# Patient Record
Sex: MALE | Race: BLACK OR AFRICAN AMERICAN | NOT HISPANIC OR LATINO | Employment: UNEMPLOYED | URBAN - METROPOLITAN AREA
[De-identification: names, ages, dates, MRNs, and addresses within clinical notes are randomized per-mention and may not be internally consistent; named-entity substitution may affect disease eponyms.]

---

## 2023-09-29 ENCOUNTER — HOSPITAL ENCOUNTER (EMERGENCY)
Facility: CLINIC | Age: 23
Discharge: HOME | End: 2023-09-29
Attending: EMERGENCY MEDICINE | Admitting: EMERGENCY MEDICINE

## 2023-09-29 ENCOUNTER — APPOINTMENT (OUTPATIENT)
Dept: CT IMAGING | Facility: CLINIC | Age: 23
End: 2023-09-29
Attending: EMERGENCY MEDICINE

## 2023-09-29 VITALS
RESPIRATION RATE: 18 BRPM | HEIGHT: 67 IN | DIASTOLIC BLOOD PRESSURE: 68 MMHG | BODY MASS INDEX: 25.11 KG/M2 | SYSTOLIC BLOOD PRESSURE: 103 MMHG | HEART RATE: 50 BPM | TEMPERATURE: 97.8 F | WEIGHT: 160 LBS | OXYGEN SATURATION: 99 %

## 2023-09-29 DIAGNOSIS — N20.1 LEFT URETERAL STONE: ICD-10-CM

## 2023-09-29 DIAGNOSIS — R10.9 LEFT FLANK PAIN: ICD-10-CM

## 2023-09-29 DIAGNOSIS — N20.0 NEPHROLITHIASIS: ICD-10-CM

## 2023-09-29 LAB
ALBUMIN SERPL BCG-MCNC: 4.4 G/DL (ref 3.5–5.2)
ALBUMIN UR-MCNC: 10 MG/DL
ALP SERPL-CCNC: 57 U/L (ref 40–129)
ALT SERPL W P-5'-P-CCNC: 21 U/L (ref 0–70)
ANION GAP SERPL CALCULATED.3IONS-SCNC: 9 MMOL/L (ref 7–15)
APPEARANCE UR: CLEAR
AST SERPL W P-5'-P-CCNC: 26 U/L (ref 0–45)
BASOPHILS # BLD AUTO: 0 10E3/UL (ref 0–0.2)
BASOPHILS NFR BLD AUTO: 0 %
BILIRUB SERPL-MCNC: 0.5 MG/DL
BILIRUB UR QL STRIP: NEGATIVE
BUN SERPL-MCNC: 14.6 MG/DL (ref 6–20)
CALCIUM SERPL-MCNC: 9.4 MG/DL (ref 8.6–10)
CHLORIDE SERPL-SCNC: 104 MMOL/L (ref 98–107)
COLOR UR AUTO: YELLOW
CREAT SERPL-MCNC: 0.81 MG/DL (ref 0.67–1.17)
DEPRECATED HCO3 PLAS-SCNC: 24 MMOL/L (ref 22–29)
EGFRCR SERPLBLD CKD-EPI 2021: >90 ML/MIN/1.73M2
EOSINOPHIL # BLD AUTO: 0.2 10E3/UL (ref 0–0.7)
EOSINOPHIL NFR BLD AUTO: 3 %
ERYTHROCYTE [DISTWIDTH] IN BLOOD BY AUTOMATED COUNT: 11.9 % (ref 10–15)
GLUCOSE SERPL-MCNC: 100 MG/DL (ref 70–99)
GLUCOSE UR STRIP-MCNC: NEGATIVE MG/DL
HCT VFR BLD AUTO: 41.8 % (ref 40–53)
HGB BLD-MCNC: 14.1 G/DL (ref 13.3–17.7)
HGB UR QL STRIP: ABNORMAL
IMM GRANULOCYTES # BLD: 0 10E3/UL
IMM GRANULOCYTES NFR BLD: 0 %
KETONES UR STRIP-MCNC: NEGATIVE MG/DL
LEUKOCYTE ESTERASE UR QL STRIP: NEGATIVE
LIPASE SERPL-CCNC: 37 U/L (ref 13–60)
LYMPHOCYTES # BLD AUTO: 2.2 10E3/UL (ref 0.8–5.3)
LYMPHOCYTES NFR BLD AUTO: 24 %
MCH RBC QN AUTO: 28.5 PG (ref 26.5–33)
MCHC RBC AUTO-ENTMCNC: 33.7 G/DL (ref 31.5–36.5)
MCV RBC AUTO: 85 FL (ref 78–100)
MONOCYTES # BLD AUTO: 0.6 10E3/UL (ref 0–1.3)
MONOCYTES NFR BLD AUTO: 6 %
MUCOUS THREADS #/AREA URNS LPF: PRESENT /LPF
NEUTROPHILS # BLD AUTO: 6.1 10E3/UL (ref 1.6–8.3)
NEUTROPHILS NFR BLD AUTO: 67 %
NITRATE UR QL: NEGATIVE
NRBC # BLD AUTO: 0 10E3/UL
NRBC BLD AUTO-RTO: 0 /100
PH UR STRIP: 6.5 [PH] (ref 5–7)
PLATELET # BLD AUTO: 143 10E3/UL (ref 150–450)
POTASSIUM SERPL-SCNC: 3.8 MMOL/L (ref 3.4–5.3)
PROT SERPL-MCNC: 7.4 G/DL (ref 6.4–8.3)
RBC # BLD AUTO: 4.94 10E6/UL (ref 4.4–5.9)
RBC URINE: 131 /HPF
SODIUM SERPL-SCNC: 137 MMOL/L (ref 135–145)
SP GR UR STRIP: 1.02 (ref 1–1.03)
UROBILINOGEN UR STRIP-MCNC: NORMAL MG/DL
WBC # BLD AUTO: 9.2 10E3/UL (ref 4–11)
WBC URINE: 2 /HPF

## 2023-09-29 PROCEDURE — 99284 EMERGENCY DEPT VISIT MOD MDM: CPT | Mod: 25 | Performed by: EMERGENCY MEDICINE

## 2023-09-29 PROCEDURE — 250N000011 HC RX IP 250 OP 636: Mod: JZ | Performed by: EMERGENCY MEDICINE

## 2023-09-29 PROCEDURE — 258N000003 HC RX IP 258 OP 636: Performed by: EMERGENCY MEDICINE

## 2023-09-29 PROCEDURE — 36415 COLL VENOUS BLD VENIPUNCTURE: CPT | Performed by: EMERGENCY MEDICINE

## 2023-09-29 PROCEDURE — 74176 CT ABD & PELVIS W/O CONTRAST: CPT

## 2023-09-29 PROCEDURE — 81003 URINALYSIS AUTO W/O SCOPE: CPT | Performed by: EMERGENCY MEDICINE

## 2023-09-29 PROCEDURE — 96374 THER/PROPH/DIAG INJ IV PUSH: CPT | Performed by: EMERGENCY MEDICINE

## 2023-09-29 PROCEDURE — 96361 HYDRATE IV INFUSION ADD-ON: CPT | Performed by: EMERGENCY MEDICINE

## 2023-09-29 PROCEDURE — 99284 EMERGENCY DEPT VISIT MOD MDM: CPT | Performed by: EMERGENCY MEDICINE

## 2023-09-29 PROCEDURE — 85004 AUTOMATED DIFF WBC COUNT: CPT | Performed by: EMERGENCY MEDICINE

## 2023-09-29 PROCEDURE — 83690 ASSAY OF LIPASE: CPT | Performed by: EMERGENCY MEDICINE

## 2023-09-29 PROCEDURE — 80053 COMPREHEN METABOLIC PANEL: CPT | Performed by: EMERGENCY MEDICINE

## 2023-09-29 RX ORDER — OXYCODONE HYDROCHLORIDE 5 MG/1
5 TABLET ORAL EVERY 6 HOURS PRN
Qty: 8 TABLET | Refills: 0 | Status: SHIPPED | OUTPATIENT
Start: 2023-09-29 | End: 2023-10-02

## 2023-09-29 RX ORDER — KETOROLAC TROMETHAMINE 15 MG/ML
15 INJECTION, SOLUTION INTRAMUSCULAR; INTRAVENOUS ONCE
Status: COMPLETED | OUTPATIENT
Start: 2023-09-29 | End: 2023-09-29

## 2023-09-29 RX ORDER — TAMSULOSIN HYDROCHLORIDE 0.4 MG/1
0.4 CAPSULE ORAL DAILY
Qty: 7 CAPSULE | Refills: 0 | Status: SHIPPED | OUTPATIENT
Start: 2023-09-29 | End: 2023-10-06

## 2023-09-29 RX ORDER — ONDANSETRON 4 MG/1
4 TABLET, ORALLY DISINTEGRATING ORAL EVERY 8 HOURS PRN
Qty: 10 TABLET | Refills: 0 | Status: SHIPPED | OUTPATIENT
Start: 2023-09-29 | End: 2023-10-02

## 2023-09-29 RX ADMIN — KETOROLAC TROMETHAMINE 15 MG: 15 INJECTION, SOLUTION INTRAMUSCULAR; INTRAVENOUS at 05:40

## 2023-09-29 RX ADMIN — SODIUM CHLORIDE 1000 ML: 9 INJECTION, SOLUTION INTRAVENOUS at 05:40

## 2023-09-29 ASSESSMENT — ACTIVITIES OF DAILY LIVING (ADL)
ADLS_ACUITY_SCORE: 35
ADLS_ACUITY_SCORE: 35

## 2023-09-29 NOTE — ED TRIAGE NOTES
Pt. started around 0230 with left sided abd. pain radiating around to back.  Also nausea and vomiting.     Triage Assessment       Row Name 09/29/23 0349       Triage Assessment (Adult)    Airway WDL WDL       Respiratory WDL    Respiratory WDL WDL       Skin Circulation/Temperature WDL    Skin Circulation/Temperature WDL WDL       Cardiac WDL    Cardiac WDL WDL       Peripheral/Neurovascular WDL    Peripheral Neurovascular WDL WDL       Cognitive/Neuro/Behavioral WDL    Cognitive/Neuro/Behavioral WDL WDL

## 2023-09-29 NOTE — DISCHARGE INSTRUCTIONS
Thank you for your patience today. Please follow up with your primary doctor in 2-3 days for follow up and further evaluation. Please call to make an appointment. A referral has been place to follow up with urology. Please drink plenty of fluids so you can pass the kidney stone. Please strain your urine. Please take 600mg of Ibuprofen every 6 hours for fever and pain. Please take Tylenol 1000mg every 4-6 hours for pain. Please take Flomax as prescribed. Please take zofran as needed for nausea and vomiting. Please take Oxycodone 1-2 tablets as needed for severe pain. (Please do not drive while on this medication). Please return to the ER if you develop high fever, persistent vomiting, severe pain, or any worsening of your current symptoms. It was a pleasure taking care of you today. We hope you feel better soon

## 2023-09-29 NOTE — ED PROVIDER NOTES
"ED Provider Note  [facility]      History     Chief Complaint   Patient presents with    Abdominal Pain    Flank Pain     left side     HPI  Ricci Mercedes is a 23 year old male who has no significant past medical history who presents to the emergency department for evaluation of flank pain.  Patient complains of left-sided flank pain that started this morning around 2:30 AM.  Patient describes the pain as a constant sharp stabbing pain in his left flank with some radiation down to his groin.  Patient reports pain is associated with nausea, vomiting.  Denies any fever, chills, chest pain, shortness of breath, dysuria.  No history of similar symptoms in the past.  No medications prior to arrival.  No other complaints.    Past Medical History  History reviewed. No pertinent past medical history.  History reviewed. No pertinent surgical history.  ondansetron (ZOFRAN ODT) 4 MG ODT tab  oxyCODONE (ROXICODONE) 5 MG tablet  tamsulosin (FLOMAX) 0.4 MG capsule      No Known Allergies  Family History  History reviewed. No pertinent family history.  Social History   Social History     Tobacco Use    Smoking status: Never    Smokeless tobacco: Never   Substance Use Topics    Alcohol use: Never    Drug use: Never      Past medical history, past surgical history, medications, allergies, family history, and social history were reviewed with the patient. No additional pertinent items.      A medically appropriate review of systems was performed with pertinent positives and negatives noted in the HPI, and all other systems negative.    Physical Exam   BP: 93/60  Pulse: (!) 47  Temp: 97.7  F (36.5  C)  Resp: 16  Height: 170.2 cm (5' 7\")  Weight: 72.6 kg (160 lb)  SpO2: 100 %  Physical Exam  General: Afebrile, moderate distress secondary to pain.  HEENT: Normocephalic, atraumatic, conjunctivae normal. MMM  Neck: non-tender, supple  Cardio: regular rate. regular rhythm   Resp: Normal work of breathing, no " respiratory distress, lungs clear bilaterally, no wheezing, rhonchi, rales  Chest/Back: no visual signs of trauma, positive left flank tenderness  Abdomen: soft, non distension, no tenderness, no rebound, no guarding, no peritoneal signs   Neuro: alert and fully oriented. CN II-XII grossly intact. Grossly normal strength and sensation in all extremities.   MSK: no deformities. Normal range of motion  Integumentary/Skin: no rash visualized, normal color  Psych: normal affect, normal behavior      ED Course, Procedures, & Data      Procedures       Results for orders placed or performed during the hospital encounter of 09/29/23   Abd/pelvis CT no contrast - Stone Protocol     Status: None    Narrative    EXAM: CT ABDOMEN PELVIS W/O CONTRAST  LOCATION: [facility]  DATE: 9/29/2023    INDICATION: flank pain  COMPARISON: None.  TECHNIQUE: CT scan of the abdomen and pelvis was performed without IV contrast. Multiplanar reformats were obtained. Dose reduction techniques were used.  CONTRAST: None.    FINDINGS:   LOWER CHEST: Normal.    HEPATOBILIARY: Normal.    PANCREAS: Normal.    SPLEEN: Normal.    ADRENAL GLANDS: Normal.    KIDNEYS/BLADDER: Punctate nonobstructing calyceal calculus of the right upper pole. 2 mm calculus of the distal left ureter with associated mild upstream hydroureter. Unremarkable urinary bladder.    BOWEL: Large volume colonic stool suggestive of constipation. No bowel obstruction.    LYMPH NODES: Normal.    VASCULATURE: Unremarkable.    PELVIC ORGANS: Prominent prostate.    MUSCULOSKELETAL: Normal.      Impression    IMPRESSION:   1.  2 mm calculus of the distal left ureter with associated mild upstream hydroureter.  2.  Punctate nonobstructing calyceal calculus of the right upper pole.  3.  Large volume colonic stool suggestive of constipation.     Comprehensive metabolic panel     Status: Abnormal   Result Value Ref Range    Sodium 137 135 - 145 mmol/L     Potassium 3.8 3.4 - 5.3 mmol/L    Carbon Dioxide (CO2) 24 22 - 29 mmol/L    Anion Gap 9 7 - 15 mmol/L    Urea Nitrogen 14.6 6.0 - 20.0 mg/dL    Creatinine 0.81 0.67 - 1.17 mg/dL    GFR Estimate >90 >60 mL/min/1.73m2    Calcium 9.4 8.6 - 10.0 mg/dL    Chloride 104 98 - 107 mmol/L    Glucose 100 (H) 70 - 99 mg/dL    Alkaline Phosphatase 57 40 - 129 U/L    AST 26 0 - 45 U/L    ALT 21 0 - 70 U/L    Protein Total 7.4 6.4 - 8.3 g/dL    Albumin 4.4 3.5 - 5.2 g/dL    Bilirubin Total 0.5 <=1.2 mg/dL   Lipase     Status: Normal   Result Value Ref Range    Lipase 37 13 - 60 U/L   UA with Microscopic reflex to Culture     Status: Abnormal    Specimen: Urine, Midstream   Result Value Ref Range    Color Urine Yellow Colorless, Straw, Light Yellow, Yellow    Appearance Urine Clear Clear    Glucose Urine Negative Negative mg/dL    Bilirubin Urine Negative Negative    Ketones Urine Negative Negative mg/dL    Specific Gravity Urine 1.023 1.003 - 1.035    Blood Urine Moderate (A) Negative    pH Urine 6.5 5.0 - 7.0    Protein Albumin Urine 10 (A) Negative mg/dL    Urobilinogen Urine Normal Normal, 2.0 mg/dL    Nitrite Urine Negative Negative    Leukocyte Esterase Urine Negative Negative    Mucus Urine Present (A) None Seen /LPF    RBC Urine 131 (H) <=2 /HPF    WBC Urine 2 <=5 /HPF    Narrative    Urine Culture not indicated   CBC with platelets and differential     Status: Abnormal   Result Value Ref Range    WBC Count 9.2 4.0 - 11.0 10e3/uL    RBC Count 4.94 4.40 - 5.90 10e6/uL    Hemoglobin 14.1 13.3 - 17.7 g/dL    Hematocrit 41.8 40.0 - 53.0 %    MCV 85 78 - 100 fL    MCH 28.5 26.5 - 33.0 pg    MCHC 33.7 31.5 - 36.5 g/dL    RDW 11.9 10.0 - 15.0 %    Platelet Count 143 (L) 150 - 450 10e3/uL    % Neutrophils 67 %    % Lymphocytes 24 %    % Monocytes 6 %    % Eosinophils 3 %    % Basophils 0 %    % Immature Granulocytes 0 %    NRBCs per 100 WBC 0 <1 /100    Absolute Neutrophils 6.1 1.6 - 8.3 10e3/uL    Absolute Lymphocytes 2.2 0.8 -  5.3 10e3/uL    Absolute Monocytes 0.6 0.0 - 1.3 10e3/uL    Absolute Eosinophils 0.2 0.0 - 0.7 10e3/uL    Absolute Basophils 0.0 0.0 - 0.2 10e3/uL    Absolute Immature Granulocytes 0.0 <=0.4 10e3/uL    Absolute NRBCs 0.0 10e3/uL   CBC with platelets differential     Status: Abnormal    Narrative    The following orders were created for panel order CBC with platelets differential.  Procedure                               Abnormality         Status                     ---------                               -----------         ------                     CBC with platelets and d...[[id number]]  Abnormal            Final result                 Please view results for these tests on the individual orders.     Medications   sodium chloride 0.9% BOLUS 1,000 mL (0 mLs Intravenous Stopped 9/29/23 0633)   ketorolac (TORADOL) injection 15 mg (15 mg Intravenous $Given 9/29/23 0540)     Labs Ordered and Resulted from Time of ED Arrival to Time of ED Departure   COMPREHENSIVE METABOLIC PANEL - Abnormal       Result Value    Sodium 137      Potassium 3.8      Carbon Dioxide (CO2) 24      Anion Gap 9      Urea Nitrogen 14.6      Creatinine 0.81      GFR Estimate >90      Calcium 9.4      Chloride 104      Glucose 100 (*)     Alkaline Phosphatase 57      AST 26      ALT 21      Protein Total 7.4      Albumin 4.4      Bilirubin Total 0.5     ROUTINE UA WITH MICROSCOPIC REFLEX TO CULTURE - Abnormal    Color Urine Yellow      Appearance Urine Clear      Glucose Urine Negative      Bilirubin Urine Negative      Ketones Urine Negative      Specific Gravity Urine 1.023      Blood Urine Moderate (*)     pH Urine 6.5      Protein Albumin Urine 10 (*)     Urobilinogen Urine Normal      Nitrite Urine Negative      Leukocyte Esterase Urine Negative      Mucus Urine Present (*)     RBC Urine 131 (*)     WBC Urine 2     CBC WITH PLATELETS AND DIFFERENTIAL - Abnormal    WBC Count 9.2      RBC Count 4.94      Hemoglobin 14.1      Hematocrit 41.8       MCV 85      MCH 28.5      MCHC 33.7      RDW 11.9      Platelet Count 143 (*)     % Neutrophils 67      % Lymphocytes 24      % Monocytes 6      % Eosinophils 3      % Basophils 0      % Immature Granulocytes 0      NRBCs per 100 WBC 0      Absolute Neutrophils 6.1      Absolute Lymphocytes 2.2      Absolute Monocytes 0.6      Absolute Eosinophils 0.2      Absolute Basophils 0.0      Absolute Immature Granulocytes 0.0      Absolute NRBCs 0.0     LIPASE - Normal    Lipase 37       Abd/pelvis CT no contrast - Stone Protocol   Final Result   IMPRESSION:    1.  2 mm calculus of the distal left ureter with associated mild upstream hydroureter.   2.  Punctate nonobstructing calyceal calculus of the right upper pole.   3.  Large volume colonic stool suggestive of constipation.                Critical care was not performed.     Medical Decision Making  The patient's presentation was of moderate complexity (an acute illness with systemic symptoms).    The patient's evaluation involved:  ordering and/or review of 3+ test(s) in this encounter (see separate area of note for details)  independent interpretation of testing performed by another health professional (CT)    The patient's management necessitated moderate risk (prescription drug management including medications given in the ED).    Assessment & Plan    Ricci Mercedes is a 23 year old male who has no significant past medical history who presents to the emergency department for evaluation of flank pain.  Upon arrival patient is nontoxic-appearing, afebrile, moderate distress secondary to pain.  Patient hemodynamically stable and vital signs within normal limits.  Differential diagnosis includes but is not limited to nephrolithiasis versus pyelonephritis versus cystitis versus intra-abdominal pathology/infection versus musculoskeletal among others.  Upon arrival comprehensive labs were performed, patient was treated with IV Toradol, 1 L IV fluid bolus.  I reviewed  comprehensive labs which are unremarkable with white blood cell count 9.2, hemoglobin 14.1, no acute metabolic or electrolyte abnormality, no transaminitis, normal lipase, urinalysis with no evidence of acute infection, moderate amount of blood, 131 white blood cells.  CT scan was performed.  I personally reviewed and interpreted CT scan of the abdomen pelvis which demonstrates 2 mm calculus of the distal left ureter with mild hydroureter.     On reevaluation patient resting comfortably with significant improvement of his symptoms after IV fluids, IV medications.  I discussed results with patient, mother.  Patient feels comfortable with discharge home with ongoing oral hydration, pain control, close outpatient follow-up with his primary care provider or urology. -Referral placed.  Strict return precautions discussed.  Patient understands and agrees with the plan.    I have reviewed the nursing notes. I have reviewed the findings, diagnosis, plan and need for follow up with the patient.    Discharge Medication List as of 9/29/2023  7:13 AM        START taking these medications    Details   ondansetron (ZOFRAN ODT) 4 MG ODT tab Take 1 tablet (4 mg) by mouth every 8 hours as needed for nausea, Disp-10 tablet, R-0, Local Print      oxyCODONE (ROXICODONE) 5 MG tablet Take 1 tablet (5 mg) by mouth every 6 hours as needed for severe pain, Disp-8 tablet, R-0, Local Print      tamsulosin (FLOMAX) 0.4 MG capsule Take 1 capsule (0.4 mg) by mouth daily for 7 days, Disp-7 capsule, R-0, Local Print             Final diagnoses:   Left flank pain   Nephrolithiasis   Left ureteral stone       Elaien Avina MD  [facility] EMERGENCY DEPARTMENT  9/29/2023     Elaine Avina MD  09/30/23 0016

## 2025-06-22 ENCOUNTER — HOSPITAL ENCOUNTER (OUTPATIENT)
Facility: CLINIC | Age: 25
Discharge: HOME | End: 2025-06-23
Attending: EMERGENCY MEDICINE | Admitting: EMERGENCY MEDICINE
Payer: COMMERCIAL

## 2025-06-22 ENCOUNTER — APPOINTMENT (OUTPATIENT)
Dept: CT IMAGING | Facility: CLINIC | Age: 25
End: 2025-06-22
Attending: EMERGENCY MEDICINE
Payer: COMMERCIAL

## 2025-06-22 DIAGNOSIS — K35.30 ACUTE APPENDICITIS WITH LOCALIZED PERITONITIS, WITHOUT PERFORATION, ABSCESS, OR GANGRENE: ICD-10-CM

## 2025-06-22 LAB
ALBUMIN SERPL BCG-MCNC: 4.7 G/DL (ref 3.5–5.2)
ALBUMIN UR-MCNC: NEGATIVE MG/DL
ALP SERPL-CCNC: 65 U/L (ref 40–150)
ALT SERPL W P-5'-P-CCNC: 23 U/L (ref 0–70)
ANION GAP SERPL CALCULATED.3IONS-SCNC: 14 MMOL/L (ref 7–15)
APPEARANCE UR: CLEAR
AST SERPL W P-5'-P-CCNC: 30 U/L (ref 0–45)
BASOPHILS # BLD AUTO: 0 10E3/UL (ref 0–0.2)
BASOPHILS NFR BLD AUTO: 0 %
BILIRUB SERPL-MCNC: 0.9 MG/DL
BILIRUB UR QL STRIP: NEGATIVE
BUN SERPL-MCNC: 19.2 MG/DL (ref 6–20)
CALCIUM SERPL-MCNC: 9.6 MG/DL (ref 8.8–10.4)
CHLORIDE SERPL-SCNC: 104 MMOL/L (ref 98–107)
COLOR UR AUTO: YELLOW
CREAT SERPL-MCNC: 0.82 MG/DL (ref 0.67–1.17)
EGFRCR SERPLBLD CKD-EPI 2021: >90 ML/MIN/1.73M2
EOSINOPHIL # BLD AUTO: 0.2 10E3/UL (ref 0–0.7)
EOSINOPHIL NFR BLD AUTO: 1 %
ERYTHROCYTE [DISTWIDTH] IN BLOOD BY AUTOMATED COUNT: 11.6 % (ref 10–15)
GLUCOSE SERPL-MCNC: 103 MG/DL (ref 70–99)
GLUCOSE UR STRIP-MCNC: NEGATIVE MG/DL
HCO3 SERPL-SCNC: 23 MMOL/L (ref 22–29)
HCT VFR BLD AUTO: 44.4 % (ref 40–53)
HGB BLD-MCNC: 15.3 G/DL (ref 13.3–17.7)
HGB UR QL STRIP: NEGATIVE
HOLD SPECIMEN: NORMAL
IMM GRANULOCYTES # BLD: 0 10E3/UL
IMM GRANULOCYTES NFR BLD: 0 %
KETONES UR STRIP-MCNC: 40 MG/DL
LEUKOCYTE ESTERASE UR QL STRIP: NEGATIVE
LYMPHOCYTES # BLD AUTO: 1.4 10E3/UL (ref 0.8–5.3)
LYMPHOCYTES NFR BLD AUTO: 11 %
MCH RBC QN AUTO: 29.5 PG (ref 26.5–33)
MCHC RBC AUTO-ENTMCNC: 34.5 G/DL (ref 31.5–36.5)
MCV RBC AUTO: 86 FL (ref 78–100)
MONOCYTES # BLD AUTO: 0.9 10E3/UL (ref 0–1.3)
MONOCYTES NFR BLD AUTO: 7 %
MUCOUS THREADS #/AREA URNS LPF: PRESENT /LPF
NEUTROPHILS # BLD AUTO: 10.6 10E3/UL (ref 1.6–8.3)
NEUTROPHILS NFR BLD AUTO: 81 %
NITRATE UR QL: NEGATIVE
NRBC # BLD AUTO: 0 10E3/UL
NRBC BLD AUTO-RTO: 0 /100
PH UR STRIP: 6.5 [PH] (ref 5–7)
PLATELET # BLD AUTO: 155 10E3/UL (ref 150–450)
POTASSIUM SERPL-SCNC: 4 MMOL/L (ref 3.4–5.3)
PROT SERPL-MCNC: 7.5 G/DL (ref 6.4–8.3)
RADIOLOGIST FLAGS: ABNORMAL
RBC # BLD AUTO: 5.18 10E6/UL (ref 4.4–5.9)
RBC URINE: <1 /HPF
SODIUM SERPL-SCNC: 141 MMOL/L (ref 135–145)
SP GR UR STRIP: 1.03 (ref 1–1.03)
SQUAMOUS EPITHELIAL: <1 /HPF
UROBILINOGEN UR STRIP-MCNC: NORMAL MG/DL
WBC # BLD AUTO: 13.1 10E3/UL (ref 4–11)
WBC URINE: 1 /HPF

## 2025-06-22 PROCEDURE — 83690 ASSAY OF LIPASE: CPT | Performed by: EMERGENCY MEDICINE

## 2025-06-22 PROCEDURE — 250N000011 HC RX IP 250 OP 636: Performed by: EMERGENCY MEDICINE

## 2025-06-22 PROCEDURE — 81001 URINALYSIS AUTO W/SCOPE: CPT | Performed by: EMERGENCY MEDICINE

## 2025-06-22 PROCEDURE — 84155 ASSAY OF PROTEIN SERUM: CPT | Performed by: EMERGENCY MEDICINE

## 2025-06-22 PROCEDURE — 85025 COMPLETE CBC W/AUTO DIFF WBC: CPT | Performed by: EMERGENCY MEDICINE

## 2025-06-22 PROCEDURE — 250N000009 HC RX 250: Performed by: EMERGENCY MEDICINE

## 2025-06-22 PROCEDURE — 74177 CT ABD & PELVIS W/CONTRAST: CPT

## 2025-06-22 PROCEDURE — 36415 COLL VENOUS BLD VENIPUNCTURE: CPT | Performed by: EMERGENCY MEDICINE

## 2025-06-22 RX ORDER — ONDANSETRON 2 MG/ML
4 INJECTION INTRAMUSCULAR; INTRAVENOUS EVERY 30 MIN PRN
Status: DISCONTINUED | OUTPATIENT
Start: 2025-06-22 | End: 2025-06-23 | Stop reason: HOSPADM

## 2025-06-22 RX ORDER — MORPHINE SULFATE 2 MG/ML
4 INJECTION, SOLUTION INTRAMUSCULAR; INTRAVENOUS
Refills: 0 | Status: COMPLETED | OUTPATIENT
Start: 2025-06-22 | End: 2025-06-22

## 2025-06-22 RX ORDER — IOPAMIDOL 755 MG/ML
100 INJECTION, SOLUTION INTRAVASCULAR ONCE
Status: COMPLETED | OUTPATIENT
Start: 2025-06-22 | End: 2025-06-22

## 2025-06-22 RX ADMIN — IOPAMIDOL 66 ML: 755 INJECTION, SOLUTION INTRAVENOUS at 23:09

## 2025-06-22 RX ADMIN — MORPHINE SULFATE 4 MG: 2 INJECTION, SOLUTION INTRAMUSCULAR; INTRAVENOUS at 21:20

## 2025-06-22 RX ADMIN — SODIUM CHLORIDE 56 ML: 9 INJECTION, SOLUTION INTRAVENOUS at 23:10

## 2025-06-22 ASSESSMENT — ACTIVITIES OF DAILY LIVING (ADL)
ADLS_ACUITY_SCORE: 41

## 2025-06-23 ENCOUNTER — ANESTHESIA EVENT (OUTPATIENT)
Dept: SURGERY | Facility: CLINIC | Age: 25
End: 2025-06-23
Payer: COMMERCIAL

## 2025-06-23 ENCOUNTER — ANESTHESIA (OUTPATIENT)
Dept: SURGERY | Facility: CLINIC | Age: 25
End: 2025-06-23
Payer: COMMERCIAL

## 2025-06-23 VITALS
TEMPERATURE: 97.8 F | RESPIRATION RATE: 14 BRPM | BODY MASS INDEX: 19.26 KG/M2 | SYSTOLIC BLOOD PRESSURE: 105 MMHG | WEIGHT: 134.5 LBS | HEIGHT: 70 IN | HEART RATE: 57 BPM | DIASTOLIC BLOOD PRESSURE: 62 MMHG | OXYGEN SATURATION: 97 %

## 2025-06-23 LAB
ABO + RH BLD: ABNORMAL
BLD GP AB INVEST PLASRBC-IMP: NORMAL
BLD GP AB SCN SERPL QL: POSITIVE
BLOOD GROUP ANTIBODIES SERPL ELUTION: NORMAL
DAT POLY: NEGATIVE
INR PPP: 1.18 (ref 0.85–1.15)
LIPASE SERPL-CCNC: 22 U/L (ref 13–60)
PROTHROMBIN TIME: 14.9 SECONDS (ref 11.8–14.8)
SPECIMEN EXP DATE BLD: ABNORMAL
SPECIMEN EXP DATE BLD: NORMAL

## 2025-06-23 PROCEDURE — 86860 RBC ANTIBODY ELUTION: CPT | Performed by: EMERGENCY MEDICINE

## 2025-06-23 PROCEDURE — 86905 BLOOD TYPING RBC ANTIGENS: CPT | Performed by: EMERGENCY MEDICINE

## 2025-06-23 PROCEDURE — 88304 TISSUE EXAM BY PATHOLOGIST: CPT | Mod: 26 | Performed by: STUDENT IN AN ORGANIZED HEALTH CARE EDUCATION/TRAINING PROGRAM

## 2025-06-23 PROCEDURE — 86870 RBC ANTIBODY IDENTIFICATION: CPT | Performed by: EMERGENCY MEDICINE

## 2025-06-23 PROCEDURE — 44970 LAPAROSCOPY APPENDECTOMY: CPT | Mod: GC | Performed by: SURGERY

## 2025-06-23 PROCEDURE — 96365 THER/PROPH/DIAG IV INF INIT: CPT | Mod: 59 | Performed by: EMERGENCY MEDICINE

## 2025-06-23 PROCEDURE — 360N000076 HC SURGERY LEVEL 3, PER MIN: Performed by: SURGERY

## 2025-06-23 PROCEDURE — 999N000141 HC STATISTIC PRE-PROCEDURE NURSING ASSESSMENT: Performed by: SURGERY

## 2025-06-23 PROCEDURE — 86900 BLOOD TYPING SEROLOGIC ABO: CPT | Performed by: EMERGENCY MEDICINE

## 2025-06-23 PROCEDURE — 250N000011 HC RX IP 250 OP 636

## 2025-06-23 PROCEDURE — 99285 EMERGENCY DEPT VISIT HI MDM: CPT | Mod: 25 | Performed by: EMERGENCY MEDICINE

## 2025-06-23 PROCEDURE — 96366 THER/PROPH/DIAG IV INF ADDON: CPT | Mod: XU | Performed by: EMERGENCY MEDICINE

## 2025-06-23 PROCEDURE — 88304 TISSUE EXAM BY PATHOLOGIST: CPT | Mod: TC | Performed by: SURGERY

## 2025-06-23 PROCEDURE — 250N000025 HC SEVOFLURANE, PER MIN: Performed by: SURGERY

## 2025-06-23 PROCEDURE — 258N000003 HC RX IP 258 OP 636: Performed by: REGISTERED NURSE

## 2025-06-23 PROCEDURE — 96375 TX/PRO/DX INJ NEW DRUG ADDON: CPT | Mod: XU | Performed by: EMERGENCY MEDICINE

## 2025-06-23 PROCEDURE — 250N000013 HC RX MED GY IP 250 OP 250 PS 637

## 2025-06-23 PROCEDURE — 99223 1ST HOSP IP/OBS HIGH 75: CPT | Mod: 57 | Performed by: SURGERY

## 2025-06-23 PROCEDURE — 85610 PROTHROMBIN TIME: CPT | Performed by: EMERGENCY MEDICINE

## 2025-06-23 PROCEDURE — 250N000011 HC RX IP 250 OP 636: Performed by: REGISTERED NURSE

## 2025-06-23 PROCEDURE — 250N000011 HC RX IP 250 OP 636: Performed by: EMERGENCY MEDICINE

## 2025-06-23 PROCEDURE — 710N000012 HC RECOVERY PHASE 2, PER MINUTE: Performed by: SURGERY

## 2025-06-23 PROCEDURE — 99285 EMERGENCY DEPT VISIT HI MDM: CPT | Performed by: EMERGENCY MEDICINE

## 2025-06-23 PROCEDURE — 370N000017 HC ANESTHESIA TECHNICAL FEE, PER MIN: Performed by: SURGERY

## 2025-06-23 PROCEDURE — 272N000001 HC OR GENERAL SUPPLY STERILE: Performed by: SURGERY

## 2025-06-23 PROCEDURE — 36415 COLL VENOUS BLD VENIPUNCTURE: CPT | Performed by: EMERGENCY MEDICINE

## 2025-06-23 PROCEDURE — 710N000010 HC RECOVERY PHASE 1, LEVEL 2, PER MIN: Performed by: SURGERY

## 2025-06-23 PROCEDURE — 250N000009 HC RX 250: Performed by: REGISTERED NURSE

## 2025-06-23 PROCEDURE — 250N000011 HC RX IP 250 OP 636: Performed by: SURGERY

## 2025-06-23 PROCEDURE — 258N000003 HC RX IP 258 OP 636: Performed by: EMERGENCY MEDICINE

## 2025-06-23 PROCEDURE — 86880 COOMBS TEST DIRECT: CPT | Performed by: EMERGENCY MEDICINE

## 2025-06-23 RX ORDER — ONDANSETRON 4 MG/1
4 TABLET, ORALLY DISINTEGRATING ORAL EVERY 6 HOURS PRN
Status: DISCONTINUED | OUTPATIENT
Start: 2025-06-23 | End: 2025-06-23 | Stop reason: HOSPADM

## 2025-06-23 RX ORDER — CEFAZOLIN SODIUM/WATER 2 G/20 ML
2 SYRINGE (ML) INTRAVENOUS SEE ADMIN INSTRUCTIONS
Status: DISCONTINUED | OUTPATIENT
Start: 2025-06-23 | End: 2025-06-23 | Stop reason: HOSPADM

## 2025-06-23 RX ORDER — SODIUM CHLORIDE, SODIUM LACTATE, POTASSIUM CHLORIDE, CALCIUM CHLORIDE 600; 310; 30; 20 MG/100ML; MG/100ML; MG/100ML; MG/100ML
INJECTION, SOLUTION INTRAVENOUS CONTINUOUS
Status: DISCONTINUED | OUTPATIENT
Start: 2025-06-23 | End: 2025-06-23 | Stop reason: HOSPADM

## 2025-06-23 RX ORDER — NALOXONE HYDROCHLORIDE 0.4 MG/ML
0.1 INJECTION, SOLUTION INTRAMUSCULAR; INTRAVENOUS; SUBCUTANEOUS
Status: DISCONTINUED | OUTPATIENT
Start: 2025-06-23 | End: 2025-06-23 | Stop reason: HOSPADM

## 2025-06-23 RX ORDER — METRONIDAZOLE 500 MG/100ML
500 INJECTION, SOLUTION INTRAVENOUS ONCE
Status: COMPLETED | OUTPATIENT
Start: 2025-06-23 | End: 2025-06-23

## 2025-06-23 RX ORDER — ONDANSETRON 4 MG/1
4 TABLET, ORALLY DISINTEGRATING ORAL EVERY 30 MIN PRN
Status: DISCONTINUED | OUTPATIENT
Start: 2025-06-23 | End: 2025-06-23 | Stop reason: HOSPADM

## 2025-06-23 RX ORDER — LIDOCAINE HYDROCHLORIDE 20 MG/ML
INJECTION, SOLUTION INFILTRATION; PERINEURAL PRN
Status: DISCONTINUED | OUTPATIENT
Start: 2025-06-23 | End: 2025-06-23

## 2025-06-23 RX ORDER — ENOXAPARIN SODIUM 100 MG/ML
40 INJECTION SUBCUTANEOUS
Status: COMPLETED | OUTPATIENT
Start: 2025-06-23 | End: 2025-06-23

## 2025-06-23 RX ORDER — CEFTRIAXONE 1 G/1
1 INJECTION, POWDER, FOR SOLUTION INTRAMUSCULAR; INTRAVENOUS ONCE
Status: COMPLETED | OUTPATIENT
Start: 2025-06-23 | End: 2025-06-23

## 2025-06-23 RX ORDER — FENTANYL CITRATE 50 UG/ML
25 INJECTION, SOLUTION INTRAMUSCULAR; INTRAVENOUS EVERY 5 MIN PRN
Status: DISCONTINUED | OUTPATIENT
Start: 2025-06-23 | End: 2025-06-23 | Stop reason: HOSPADM

## 2025-06-23 RX ORDER — HYDROMORPHONE HCL IN WATER/PF 6 MG/30 ML
0.4 PATIENT CONTROLLED ANALGESIA SYRINGE INTRAVENOUS EVERY 5 MIN PRN
Status: DISCONTINUED | OUTPATIENT
Start: 2025-06-23 | End: 2025-06-23 | Stop reason: HOSPADM

## 2025-06-23 RX ORDER — OXYCODONE HYDROCHLORIDE 10 MG/1
10 TABLET ORAL
Status: DISCONTINUED | OUTPATIENT
Start: 2025-06-23 | End: 2025-06-23 | Stop reason: HOSPADM

## 2025-06-23 RX ORDER — METRONIDAZOLE 500 MG/100ML
500 INJECTION, SOLUTION INTRAVENOUS EVERY 12 HOURS
Status: DISCONTINUED | OUTPATIENT
Start: 2025-06-23 | End: 2025-06-23 | Stop reason: HOSPADM

## 2025-06-23 RX ORDER — FENTANYL CITRATE 50 UG/ML
50 INJECTION, SOLUTION INTRAMUSCULAR; INTRAVENOUS EVERY 5 MIN PRN
Status: DISCONTINUED | OUTPATIENT
Start: 2025-06-23 | End: 2025-06-23 | Stop reason: HOSPADM

## 2025-06-23 RX ORDER — ACETAMINOPHEN 325 MG/1
975 TABLET ORAL ONCE
Status: COMPLETED | OUTPATIENT
Start: 2025-06-23 | End: 2025-06-23

## 2025-06-23 RX ORDER — HYDROMORPHONE HCL IN WATER/PF 6 MG/30 ML
0.2 PATIENT CONTROLLED ANALGESIA SYRINGE INTRAVENOUS EVERY 5 MIN PRN
Status: DISCONTINUED | OUTPATIENT
Start: 2025-06-23 | End: 2025-06-23 | Stop reason: HOSPADM

## 2025-06-23 RX ORDER — PROPOFOL 10 MG/ML
INJECTION, EMULSION INTRAVENOUS PRN
Status: DISCONTINUED | OUTPATIENT
Start: 2025-06-23 | End: 2025-06-23

## 2025-06-23 RX ORDER — METHOCARBAMOL 750 MG/1
750 TABLET, FILM COATED ORAL
Status: DISCONTINUED | OUTPATIENT
Start: 2025-06-23 | End: 2025-06-23 | Stop reason: HOSPADM

## 2025-06-23 RX ORDER — METRONIDAZOLE 500 MG/100ML
500 INJECTION, SOLUTION INTRAVENOUS EVERY 12 HOURS
Status: DISCONTINUED | OUTPATIENT
Start: 2025-06-23 | End: 2025-06-23

## 2025-06-23 RX ORDER — OXYCODONE HYDROCHLORIDE 5 MG/1
5 TABLET ORAL EVERY 6 HOURS PRN
Qty: 12 TABLET | Refills: 0 | Status: SHIPPED | OUTPATIENT
Start: 2025-06-23 | End: 2025-06-26

## 2025-06-23 RX ORDER — OXYCODONE HYDROCHLORIDE 5 MG/1
5 TABLET ORAL
Status: DISCONTINUED | OUTPATIENT
Start: 2025-06-23 | End: 2025-06-23 | Stop reason: HOSPADM

## 2025-06-23 RX ORDER — CEFAZOLIN SODIUM/WATER 2 G/20 ML
2 SYRINGE (ML) INTRAVENOUS
Status: COMPLETED | OUTPATIENT
Start: 2025-06-23 | End: 2025-06-23

## 2025-06-23 RX ORDER — ONDANSETRON 2 MG/ML
4 INJECTION INTRAMUSCULAR; INTRAVENOUS EVERY 30 MIN PRN
Status: DISCONTINUED | OUTPATIENT
Start: 2025-06-23 | End: 2025-06-23 | Stop reason: HOSPADM

## 2025-06-23 RX ORDER — IBUPROFEN 600 MG/1
600 TABLET, FILM COATED ORAL EVERY 6 HOURS PRN
Qty: 30 TABLET | Refills: 0 | Status: SHIPPED | OUTPATIENT
Start: 2025-06-23

## 2025-06-23 RX ORDER — ONDANSETRON 2 MG/ML
INJECTION INTRAMUSCULAR; INTRAVENOUS PRN
Status: DISCONTINUED | OUTPATIENT
Start: 2025-06-23 | End: 2025-06-23

## 2025-06-23 RX ORDER — SODIUM CHLORIDE, SODIUM LACTATE, POTASSIUM CHLORIDE, CALCIUM CHLORIDE 600; 310; 30; 20 MG/100ML; MG/100ML; MG/100ML; MG/100ML
INJECTION, SOLUTION INTRAVENOUS CONTINUOUS PRN
Status: DISCONTINUED | OUTPATIENT
Start: 2025-06-23 | End: 2025-06-23

## 2025-06-23 RX ORDER — DEXAMETHASONE SODIUM PHOSPHATE 4 MG/ML
4 INJECTION, SOLUTION INTRA-ARTICULAR; INTRALESIONAL; INTRAMUSCULAR; INTRAVENOUS; SOFT TISSUE
Status: DISCONTINUED | OUTPATIENT
Start: 2025-06-23 | End: 2025-06-23 | Stop reason: HOSPADM

## 2025-06-23 RX ORDER — DEXAMETHASONE SODIUM PHOSPHATE 4 MG/ML
INJECTION, SOLUTION INTRA-ARTICULAR; INTRALESIONAL; INTRAMUSCULAR; INTRAVENOUS; SOFT TISSUE PRN
Status: DISCONTINUED | OUTPATIENT
Start: 2025-06-23 | End: 2025-06-23

## 2025-06-23 RX ORDER — BUPIVACAINE HYDROCHLORIDE 5 MG/ML
INJECTION, SOLUTION PERINEURAL PRN
Status: DISCONTINUED | OUTPATIENT
Start: 2025-06-23 | End: 2025-06-23 | Stop reason: HOSPADM

## 2025-06-23 RX ORDER — ACETAMINOPHEN 325 MG/1
975 TABLET ORAL ONCE
Status: DISCONTINUED | OUTPATIENT
Start: 2025-06-23 | End: 2025-06-23 | Stop reason: HOSPADM

## 2025-06-23 RX ORDER — ACETAMINOPHEN 325 MG/1
650 TABLET ORAL EVERY 4 HOURS PRN
Qty: 50 TABLET | Refills: 0 | Status: SHIPPED | OUTPATIENT
Start: 2025-06-23

## 2025-06-23 RX ORDER — ONDANSETRON 2 MG/ML
4 INJECTION INTRAMUSCULAR; INTRAVENOUS EVERY 6 HOURS PRN
Status: DISCONTINUED | OUTPATIENT
Start: 2025-06-23 | End: 2025-06-23 | Stop reason: HOSPADM

## 2025-06-23 RX ORDER — FENTANYL CITRATE 50 UG/ML
INJECTION, SOLUTION INTRAMUSCULAR; INTRAVENOUS PRN
Status: DISCONTINUED | OUTPATIENT
Start: 2025-06-23 | End: 2025-06-23

## 2025-06-23 RX ORDER — AMOXICILLIN 250 MG
1-2 CAPSULE ORAL 2 TIMES DAILY
Qty: 30 TABLET | Refills: 0 | Status: SHIPPED | OUTPATIENT
Start: 2025-06-23

## 2025-06-23 RX ORDER — SODIUM CHLORIDE, SODIUM LACTATE, POTASSIUM CHLORIDE, CALCIUM CHLORIDE 600; 310; 30; 20 MG/100ML; MG/100ML; MG/100ML; MG/100ML
1000 INJECTION, SOLUTION INTRAVENOUS CONTINUOUS
Status: DISCONTINUED | OUTPATIENT
Start: 2025-06-23 | End: 2025-06-23 | Stop reason: HOSPADM

## 2025-06-23 RX ORDER — ESMOLOL HYDROCHLORIDE 10 MG/ML
INJECTION INTRAVENOUS PRN
Status: DISCONTINUED | OUTPATIENT
Start: 2025-06-23 | End: 2025-06-23

## 2025-06-23 RX ADMIN — Medication 20 MG: at 09:43

## 2025-06-23 RX ADMIN — HYDROMORPHONE HYDROCHLORIDE 0.5 MG: 1 INJECTION, SOLUTION INTRAMUSCULAR; INTRAVENOUS; SUBCUTANEOUS at 10:42

## 2025-06-23 RX ADMIN — PHENYLEPHRINE HYDROCHLORIDE 100 MCG: 10 INJECTION INTRAVENOUS at 09:33

## 2025-06-23 RX ADMIN — PROPOFOL 170 MG: 10 INJECTION, EMULSION INTRAVENOUS at 09:24

## 2025-06-23 RX ADMIN — ONDANSETRON 4 MG: 2 INJECTION INTRAMUSCULAR; INTRAVENOUS at 13:16

## 2025-06-23 RX ADMIN — ESMOLOL HYDROCHLORIDE 60 MG: 10 INJECTION, SOLUTION INTRAVENOUS at 09:24

## 2025-06-23 RX ADMIN — Medication 100 MG: at 10:59

## 2025-06-23 RX ADMIN — ONDANSETRON 4 MG: 2 INJECTION INTRAMUSCULAR; INTRAVENOUS at 10:41

## 2025-06-23 RX ADMIN — LIDOCAINE HYDROCHLORIDE 60 MG: 20 INJECTION, SOLUTION INFILTRATION; PERINEURAL at 09:24

## 2025-06-23 RX ADMIN — Medication 50 MG: at 09:24

## 2025-06-23 RX ADMIN — DEXAMETHASONE SODIUM PHOSPHATE 4 MG: 4 INJECTION, SOLUTION INTRAMUSCULAR; INTRAVENOUS at 09:38

## 2025-06-23 RX ADMIN — ENOXAPARIN SODIUM 40 MG: 40 INJECTION SUBCUTANEOUS at 08:28

## 2025-06-23 RX ADMIN — CEFTRIAXONE SODIUM 1 G: 1 INJECTION, POWDER, FOR SOLUTION INTRAMUSCULAR; INTRAVENOUS at 00:17

## 2025-06-23 RX ADMIN — SODIUM CHLORIDE, SODIUM LACTATE, POTASSIUM CHLORIDE, AND CALCIUM CHLORIDE: .6; .31; .03; .02 INJECTION, SOLUTION INTRAVENOUS at 09:19

## 2025-06-23 RX ADMIN — ACETAMINOPHEN 975 MG: 325 TABLET ORAL at 08:27

## 2025-06-23 RX ADMIN — Medication 2 G: at 09:23

## 2025-06-23 RX ADMIN — METRONIDAZOLE 500 MG: 500 INJECTION, SOLUTION INTRAVENOUS at 00:53

## 2025-06-23 RX ADMIN — SODIUM CHLORIDE, SODIUM LACTATE, POTASSIUM CHLORIDE, AND CALCIUM CHLORIDE 1000 ML: .6; .31; .03; .02 INJECTION, SOLUTION INTRAVENOUS at 01:45

## 2025-06-23 RX ADMIN — SODIUM CHLORIDE, SODIUM LACTATE, POTASSIUM CHLORIDE, AND CALCIUM CHLORIDE: .6; .31; .03; .02 INJECTION, SOLUTION INTRAVENOUS at 10:42

## 2025-06-23 RX ADMIN — MIDAZOLAM 2 MG: 1 INJECTION INTRAMUSCULAR; INTRAVENOUS at 09:16

## 2025-06-23 RX ADMIN — FENTANYL CITRATE 100 MCG: 50 INJECTION INTRAMUSCULAR; INTRAVENOUS at 09:37

## 2025-06-23 ASSESSMENT — ACTIVITIES OF DAILY LIVING (ADL)
ADLS_ACUITY_SCORE: 41
ADLS_ACUITY_SCORE: 42

## 2025-06-23 NOTE — ED NOTES
Report given to [facility] JUDITH He, ambulance ETA approximately 30 minutes. To be transferred over for surgery

## 2025-06-23 NOTE — ANESTHESIA POSTPROCEDURE EVALUATION
Patient: Ricci Mercedes    Procedure: Procedure(s):  Laparoscopic appendectomy       Anesthesia Type:  General    Note:  Disposition: Outpatient   Postop Pain Control: Uneventful            Sign Out: Well controlled pain   PONV: No   Neuro/Psych: Uneventful            Sign Out: Acceptable/Baseline neuro status   Airway/Respiratory: Uneventful            Sign Out: Acceptable/Baseline resp. status   CV/Hemodynamics: Uneventful            Sign Out: Acceptable CV status; No obvious hypovolemia; No obvious fluid overload   Other NRE: NONE   DID A NON-ROUTINE EVENT OCCUR? No           Last vitals:  Vitals Value Taken Time   /54 06/23/25 11:45   Temp 36.8  C (98.3  F) 06/23/25 11:19   Pulse 70 06/23/25 11:54   Resp 11    SpO2 100 % 06/23/25 11:54   Vitals shown include unfiled device data.    Electronically Signed By: Fabián Jiang MD  June 23, 2025  11:55 AM

## 2025-06-23 NOTE — BRIEF OP NOTE
[facility]    Brief Operative Note    Pre-operative diagnosis: Acute appendicitis [K35.80]  Post-operative diagnosis Same as pre-operative diagnosis    Procedure: Laparoscopic appendectomy, N/A - Abdomen    Surgeon: Surgeons and Role:     * Phillip Elliott MD - Primary     * Ninfa Chua MD - Resident - Assisting     * Melvina Bullard MD - Resident - Assisting       Maria Elena Jaramillo MD - resident assisting   Anesthesia: General   Estimated Blood Loss: 2 ml    Drains: None  Specimens:   ID Type Source Tests Collected by Time Destination   1 : Appendix Tissue Appendix SURGICAL PATHOLOGY EXAM Phillip Elliott MD 6/23/2025 10:49 AM      Findings:   Dilated and inflamed appendix, no evidence of perforation.  Complications: None.  Implants: * No implants in log *      Maria Elena Jaramillo MD  PGY-5 General Surgery  For nights/weekends, please find on call contact information via [employer] system

## 2025-06-23 NOTE — ANESTHESIA PREPROCEDURE EVALUATION
Anesthesia Pre-Procedure Evaluation    Patient: Ricci Mercedes   MRN: [id number] : 2000          Procedure : Procedure(s):  Laparoscopic appendectomy         No past medical history on file.   No past surgical history on file.   Allergies   Allergen Reactions    Blood Transfusion Related (Informational Only) Other (See Comments)     Patient has a history of a clinically significant antibody against RBC antigens.  A delay in compatible RBCs may occur. 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      Social History     Tobacco Use    Smoking status: Never    Smokeless tobacco: Never   Substance Use Topics    Alcohol use: Never      Wt Readings from Last 1 Encounters:   25 61 kg (134 lb 8 oz)        Anesthesia Evaluation            ROS/MED HX  ENT/Pulmonary:  - neg pulmonary ROS     Neurologic:  - neg neurologic ROS     Cardiovascular:  - neg cardiovascular ROS     METS/Exercise Tolerance:     Hematologic:  - neg hematologic  ROS     Musculoskeletal:       GI/Hepatic: Comment: Acute appendicitis      Renal/Genitourinary:  - neg Renal ROS     Endo:  - neg endo ROS     Psychiatric/Substance Use:  - neg psychiatric ROS     Infectious Disease:  - neg infectious disease ROS     Malignancy:       Other:              Physical Exam  Airway  Mallampati: II  TM distance: >3 FB  Neck ROM: full  Upper bite lip test: I  Mouth opening: >= 4 cm    Cardiovascular - normal exam   Dental     Pulmonary - normal exam      Neurological   Other Findings       OUTSIDE LABS:  CBC:   Lab Results   Component Value Date    WBC 13.1 (H) 2025    WBC 9.2 2023    HGB 15.3 2025    HGB 14.1 2023    HCT 44.4 2025    HCT 41.8 2023     2025     (L) 2023     BMP:   Lab Results   Component Value Date     2025     2023    POTASSIUM 4.0 2025    POTASSIUM 3.8 2023    CHLORIDE 104 2025    CHLORIDE 104 2023    CO2 23 2025    CO2 24  "09/29/2023    BUN 19.2 06/22/2025    BUN 14.6 09/29/2023    CR 0.82 06/22/2025    CR 0.81 09/29/2023     (H) 06/22/2025     (H) 09/29/2023     COAGS:   Lab Results   Component Value Date    INR 1.18 (H) 06/23/2025     POC: No results found for: \"BGM\", \"HCG\", \"HCGS\"  HEPATIC:   Lab Results   Component Value Date    ALBUMIN 4.7 06/22/2025    PROTTOTAL 7.5 06/22/2025    ALT 23 06/22/2025    AST 30 06/22/2025    ALKPHOS 65 06/22/2025    BILITOTAL 0.9 06/22/2025     OTHER:   Lab Results   Component Value Date    CHRIST 9.6 06/22/2025    LIPASE 22 06/22/2025       Anesthesia Plan    ASA Status:  1      NPO Status: NPO Appropriate   Anesthesia Type: General.  Airway: oral.  Induction: intravenous.  Maintenance: Balanced.   Techniques and Equipment:       - Monitoring Plan: standard ASA monitoring, train of four monitoring, processed EEG monitor     Consents    Anesthesia Plan(s) and associated risks, benefits, and realistic alternatives discussed. Questions answered and patient/representative(s) expressed understanding.     - Discussed: anesthesiologist     - Discussed with:  Patient          Blood Consent:      - Discussed with: not discussed.     Postoperative Care    Pain management: plan for postoperative opioid use.     Comments:                   Esteban Ventura,     I have reviewed the pertinent notes and labs in the chart from the past 30 days and (re)examined the patient.  Any updates or changes from those notes are reflected in this note.    Clinically Significant Risk Factors Present on Admission                # Coagulation Defect: INR = 1.18 (Ref range: 0.85 - 1.15) and/or PTT = N/A, will monitor for bleeding                               "

## 2025-06-23 NOTE — ED PROVIDER NOTES
"ED Provider Note  [facility]      History     Chief Complaint   Patient presents with    Abdominal Pain     The history is provided by the patient and medical records.     Ricci Mercedes is a 25 year old male with no known significant past medical history who presents to the ED for evaluation of abdominal pain.  Patient reports developing right lower quadrant and epigastric abdominal pain this morning.  Patient states he attempted to drink keyonna tea this morning but then immediately threw it up.  He states this is the last time he ate or drink something today.  He reports 2 more episodes of vomiting throughout the day.  Patient reports his pain is a 10/10  At its worst.  Patient's pain worsens while sitting upright and is lessened while lying flat.  Patient denies having diarrhea.  No dysuria, hematuria or urinary frequency.  Patient states he took tablets of Tums this afternoon without relief. No other symptoms noted.     Past Medical History  No past medical history on file.  No past surgical history on file.  No current outpatient medications on file.    No Known Allergies  Family History  No family history on file.  Social History   Social History     Tobacco Use    Smoking status: Never    Smokeless tobacco: Never   Substance Use Topics    Alcohol use: Never    Drug use: Never      Past medical history, past surgical history, medications, allergies, family history, and social history were reviewed with the patient. No additional pertinent items.   A medically appropriate review of systems was performed with pertinent positives and negatives noted in the HPI, and all other systems negative.    Physical Exam   BP: 121/71  Pulse: 56  Temp: 98  F (36.7  C)  Resp: 18  Height: 170.2 cm (5' 7\")  Weight: 61 kg (134 lb 8 oz)  SpO2: 100 %  Physical Exam  Vitals and nursing note reviewed.   Constitutional:       General: He is not in acute distress.     Appearance: He is well-developed. He " is not diaphoretic.   HENT:      Head: Normocephalic and atraumatic.      Mouth/Throat:      Pharynx: No oropharyngeal exudate.   Eyes:      General: No scleral icterus.        Right eye: No discharge.         Left eye: No discharge.      Pupils: Pupils are equal, round, and reactive to light.   Cardiovascular:      Rate and Rhythm: Normal rate and regular rhythm.      Heart sounds: Normal heart sounds. No murmur heard.     No friction rub. No gallop.   Pulmonary:      Effort: Pulmonary effort is normal. No respiratory distress.      Breath sounds: Normal breath sounds. No wheezing.   Chest:      Chest wall: No tenderness.   Abdominal:      General: Bowel sounds are normal. There is no distension.      Palpations: Abdomen is soft.      Tenderness: There is abdominal tenderness in the right lower quadrant.   Musculoskeletal:         General: No tenderness or deformity. Normal range of motion.      Cervical back: Normal range of motion and neck supple.   Skin:     General: Skin is warm and dry.      Coloration: Skin is not pale.      Findings: No erythema or rash.   Neurological:      Mental Status: He is alert and oriented to person, place, and time.      Cranial Nerves: No cranial nerve deficit.           ED Course, Procedures, & Data      Procedures                   Medications - No data to display           Assessment & Plan    This is a 25-year-old male who presents with right lower quadrant abdominal pain.  This started today.  On exam he has right lower quadrant tenderness.  Lab work shows WBC count of 13.1.  CT scan shows acute uncomplicated appendicitis.  Patient was given ceftriaxone and metronidazole.  I discussed case with General Surgery recommends transfer to [place].  I discussed all results with patient.  We will transfer to [place].    I have reviewed the nursing notes. I have reviewed the findings, diagnosis, plan and need for follow up with the patient.    New Prescriptions    No medications on  file       Final diagnoses:   None     I, Gama Almonte, am serving as a trained medical scribe to document services personally performed by Harris Zuniga DO, based on the provider's statements to me.      IHarris DO, was physically present and have reviewed and verified the accuracy of this note documented by Gama Almonte.     Harris Zuniga DO  [facility] EMERGENCY DEPARTMENT  6/22/2025     Harris Zuniga DO  06/23/25 0149

## 2025-06-23 NOTE — DISCHARGE INSTRUCTIONS
[facility], [facility]  Anesthesia Discharge Instructions      After Anesthesia (Sleep Medicine)  What should I do after anesthesia?  You should rest and relax for the next 24 hours. Avoid risky or difficult (strenuous) activity. A responsible adult should stay with you overnight.  Don't drive or use any heavy equipment for 24 hours. Even if you feel normal, your reactions may be affected by the sleep medicine given to you.  Don't drink alcohol or make any important decisions for 24 hours.  Slowly get back to your regular diet, as you feel able.    How should I expect to feel?  It's normal to feel dizzy, light-headed, or faint for up to a full day after anesthesia or while taking pain medicine. If this happens:   Sit down for a few minutes before standing.  Have someone help you when you get up to walk or use the bathroom.  If you have nausea (feel sick to your stomach) or vomit (throw up):   Drink clear liquids (such as apple juice, ginger ale, broth, or 7UP) until you feel better. Stay hydrated.  If you feel sick to your stomach, or you keep vomiting for 24 hours, please call the doctor.    What else should I know?  You might have a dry mouth, sore throat, muscle aches, or trouble sleeping. These should go away after 24 hours.  Please contact your doctor if you have any other symptoms that concern you, such as fever, pain, bleeding, fluid drainage, swelling, or headache, or if it's been over 8 to 10 hours and you still aren't able to pee (urinate).  If you have a history of sleep apnea, it's very important to use your CPAP machine for the next 24 hours when you nap or sleep.     To contact a doctor, call Dr. Elliott's clinic at [phone].  After 5PM, call: [phone] for the [facility] hospital, and ask for the resident on call for General Surgery.  For any emergencies, please go to the emergency room.      Appendectomy: What to Expect at Home  Your Recovery     Your doctor  removed your appendix either by making many small cuts, called incisions, in your belly (laparoscopic surgery) or through open surgery. In open surgery, the doctor makes one large incision. The incisions leave scars that usually fade over time.  After your surgery, it is normal to feel weak and tired for several days after you return home. Your belly may be swollen and may be painful. If you had laparoscopic surgery, you may have shoulder pain. This is caused by the air the doctor put in your belly to help see the organs better. The pain may last for a day or two.  You may also have nausea or vomiting, diarrhea, constipation, gas, or a headache. These problems usually go away in a few days.  Your recovery time depends on the type of surgery you had. If you had laparoscopic surgery, you will probably be able to return to work or a normal routine in a couple of weeks after surgery. If you had an open surgery, it may take longer. If your appendix ruptured, you may have a drain in your incision.  Your body will work fine without an appendix. You won't have to make any changes in your diet or lifestyle.  This care sheet gives you a general idea about how long it will take for you to recover. But each person recovers at a different pace. Follow the steps below to get better as quickly as possible.  How can you care for yourself at home?  Activity    Rest when you feel tired. Getting enough sleep will help you recover.     Try to walk each day. Start by walking a little more than you did the day before. Bit by bit, increase the amount you walk. Walking boosts blood flow and helps prevent pneumonia and constipation.     For about 2 weeks, avoid lifting anything that would make you strain. This may include a child, heavy grocery bags and milk containers, a heavy briefcase or backpack, cat litter or dog food bags, or a vacuum .     Avoid strenuous activities, such as bicycle riding, jogging, weight lifting, or aerobic  exercise. Do this for about 2 weeks or until your doctor says it is okay.     You may take showers 24 hours after surgery. Pat the incision dry. Do not take a bath for the first 2 weeks.      You may drive when you are no longer taking pain medicine and can quickly move your foot from the gas pedal to the brake. You must also be able to sit comfortably for a long period of time, even if you do not plan on going far. You might get caught in traffic.     You will probably be able to go back to work in 1 to 2 weeks. If you had an open surgery, it may take longer.     Ask your doctor when it is okay for you to have sex.   Diet    You can eat your normal diet. If your stomach is upset, start with small amounts of food.     Drink plenty of fluids (unless your doctor tells you not to).     You may notice that your bowel movements are not regular right after your surgery. This is common. Try to avoid constipation and straining with bowel movements. You may want to take a fiber supplement every day. If you have not had a bowel movement after a couple of days, ask your doctor about taking a mild laxative.   Medicines    Your doctor will tell you if and when you can restart your medicines. You also will be given instructions about taking any new medicines.     If you stopped taking aspirin or some other blood thinner, your doctor will tell you when to start taking it again.     If your appendix ruptured, you will need to take antibiotics. Take them as directed. Do not stop taking them just because you feel better. You need to take the full course of antibiotics.     Be safe with medicines. Take pain medicines exactly as directed.  If the doctor gave you a prescription medicine for pain, take it as prescribed.  If you are not taking a prescription pain medicine, take an over-the-counter medicine such as acetaminophen (Tylenol), ibuprofen (Advil, Motrin), or naproxen (Aleve). Read and follow all instructions on the label.  Do  not take two or more pain medicines at the same time unless the doctor told you to. Many pain medicines have acetaminophen, which is Tylenol. Too much Tylenol can be harmful.     If you think your pain medicine is making you sick to your stomach:  Take your medicine after meals (unless your doctor has told you not to).  Ask your doctor for a different pain medicine.   Incision care        If you have strips of tape or glue on the incisions, leave on until it falls off.     Gently wash the area with warm, soapy water 24 to 48 hours after your surgery, unless your doctor tells you not to. Pat the area dry.     Keep the area clean and dry. You may cover it with a gauze bandage if it oozes or rubs against clothing. Change the bandage every day or more often if needed.         Follow-up care is a key part of your treatment and safety. Be sure to make and go to all appointments, and call your doctor if you are having problems. It's also a good idea to know your test results and keep a list of the medicines you take.  When should you call for help?   Call 911 anytime you think you may need emergency care. For example, call if:    You passed out (lost consciousness).     You are short of breath.   Call your doctor now or seek immediate medical care if:    You have nausea or vomiting and cannot drink fluids.     You cannot pass stools or gas.     You have pain that does not get better when you take your pain medicine.     You have signs of infection, such as:  Increased pain, swelling, warmth, or redness.  Red streaks leading from the wound.  Pus draining from the wound.  A fever.     You have loose stitches, or your incision comes open.     Bright red blood has soaked through the bandage over your incision.     You have signs of a blood clot in your leg (called a deep vein thrombosis), such as:  Pain in your calf, back of knee, thigh, or groin.  Redness and swelling in your leg or groin.   Watch closely for any changes in  "your health, and be sure to contact your doctor if you have any problems.  Where can you learn more?  Go to https://[email or web address]net/patiented  Enter X801 in the search box to learn more about \"Appendectomy: What to Expect at Home.\"  Current as of: July 31, 2024  Content Version: 14.5    1023-2009 [employer].   Care instructions adapted under license by your healthcare professional. If you have questions about a medical condition or this instruction, always ask your healthcare professional. [employer] disclaims any warranty or liability for your use of this information.    "

## 2025-06-23 NOTE — OP NOTE
[facility]    Operative Note    Pre-operative diagnosis: Acute appendicitis [K35.80]   Post-operative diagnosis * No post-op diagnosis entered *   Procedure: Procedure(s):  Laparoscopic appendectomy   Surgeon: Surgeons and Role:     * Phillip Elliott MD - Primary     * Ninfa Chua MD - Resident - Assisting     * Melvina Bullard MD - Resident - Assisting   Anesthesia: General    Estimated blood loss: 2 cc   Drains: None   Specimens: ID Type Source Tests Collected by Time Destination   1 : Appendix Tissue Appendix SURGICAL PATHOLOGY EXAM Phillip Elliott MD 6/23/2025 10:49 AM       Findings: Routine laparoscopic appendectomy   The appendix was mildly inflamed.  It was not perforated.  There was no fluid near the appendix.   Complications: None.   Implants: None.         OPERATIVE INDICATIONS:  Ricci Mercedes is a 25 year old-year-old male with a one-day history of right lower quadrant abdominal pain.  The white blood cell count was 13 thousand per microliter.  The urinalysis was negative. CT scan of the abdomen and pelvis showed a retrocecal thickened appendix consistent with early, uncomplicated appendicitis. After understanding the risks and benefits of proceeding with surgery, the patient has an indication for laparoscopic appendectomy and consented to undergo surgery.      OPERATIVE DETAILS:  The patient was brought to the operating room and positioned supine on the operating room table. His abdomen was prepared and draped in a routine fashion.  A timeout was performed prior to surgery and documented by the nursing team.    Under the benefits of general anesthesia, a left upper quadrant Veress needle was inserted into a 5 mm incision and pneumoperitoneum was established using carbon dioxide gas to a maximum pressure of 15 mmHg.  No intra-abdominal trauma was visualized with the camera after entry.  Heavy stool burden of the colon was noted.  A  total of 3 ports were placed (one in the midline suprapubic region and another in the left lower quadrant, lateral and just inferior to the umbilicus).  A 30-degree 5 mm laparoscope was utilized throughout the case.    The patient was moved into a head-down position with the right side slightly up.    The cecum and terminal ileum were identified. The tenia coli were traced to the approximate insertion of the appendix (which was seen in the paracolic gutter, lateral and posterior to the cecum). There was no evidence of perforation around the appendix. It was mildly inflamed with some increased vascularity.     The appendix was elevated, by both grasping the body of the appendix and its mesoappendix.  Lysis of adhesions was performed to mobilize the appendix and obtain a 360 view of its attachments.    Once mobile enough, a window was made at the base of the appendix in the mesoappendix using blunt dissection with the unheated Ligasure, a blunt grasper, and a [place] dissector. Once the window was achieved and any remaining attachments taken down, 2 endoloops were brought onto the field. First endoloop was placed at the base of the appendix at area of splay onto the cecum. A second endoloop was secured 1 mm above the first. The appendix was then cauterized off, 2 mm above the second endoloop. The stump was visualized and noted to be hemostatic, without any stool leakage.     The appendix was placed into an endocatch bag and removed from the left 5 mm port.  The abdominal wall was not dilated for removal.    All skin incisions were closed using 4-0 monocryl suture and dermabond was applied.    Dr. Elliott (Staff) and Dr. Jaramillo (Chief) were present for all critical components of the operation, and all needle and sponge counts were correct x2 at the end of the procedure.    Ninfa Chua MD   PGY2 General Surgery  Pager [phone]  [place]

## 2025-06-23 NOTE — ANESTHESIA PROCEDURE NOTES
Airway       Patient location during procedure: OR       Procedure Start/Stop Times: 6/23/2025 9:28 AM  Staff -        CRNA: Andi Reynoso APRN CRNA       Performed By: CRNA  Consent for Airway        Urgency: elective  Indications and Patient Condition       Indications for airway management: claudy-procedural       Induction type:intravenous       Mask difficulty assessment: 1 - vent by mask    Final Airway Details       Final airway type: endotracheal airway       Successful airway: ETT - single and Oral  Endotracheal Airway Details        ETT size (mm): 7.5       Cuffed: yes       Successful intubation technique: direct laryngoscopy       DL Blade Type: MAC 4       Grade View of Cords: 1       Adjucts: stylet       Position: Right       Measured from: lips       Secured at (cm): 23       Bite block used: None    Post intubation assessment        Placement verified by: capnometry, equal breath sounds and chest rise        Number of attempts at approach: 1       Number of other approaches attempted: 0       Secured with: tape       Ease of procedure: easy       Dentition: Intact and Unchanged    Medication(s) Administered   Medication Administration Time: 6/23/2025 9:28 AM

## 2025-06-23 NOTE — CONSULTS
EGS Surgery Consult  2025    Ricci Mercedes  : 2000    Date of Service: 2025 1:30 AM    Assessment and Plan:  Ricci Mercedes is a 25 year old otherwise healthy male who presents with RLQ abdominal pain and nausea/vomiting, leukocytosis to 13.1, and imaging concerning for acute appendicitis. Risks/benefits of non-operative and operative management discussed with the patient, who would like to proceed with surgical intervention. He is consented and scheduled for laparoscopic appendectomy later today.     - Ceftriaxone/flagyl  - NPO, IVF  - Pain control  - OR for laparoscopic appendectomy today    Discussed with Dr. Medrano.     Ermelinda Rojo MD  PGY-2 General Surgery Resident    History of Present Illness:    Ricci Mercedes is a 25 year old male with no PMHx who presents with abdominal pain and nausea/vomiting. He started having umbilical abdominal pain yesterday morning, which has now progressed to his RLQ. He has had nausea and 3 episodes of vomiting, non-bloody, non-bilious. No changes in bowel habits, had a normal BM yesterday, not feeling constipated or having diarrhea. No fevers/chills. ROS otherwise negative. Last PO intake was around 10 pm, ate some chicken.     Past Medical History:  No medical conditions.     Past Surgical History  No prior surgeries.     Family History:  No family history history of bleeding dyscrasias or anesthetic complications. Family history non-cotributory.     Social History:  No tobacco, alcohol, or recreational drug use  Here with his siblings  In school for    Likes to workout        Medications:  Takes no medications, no blood thinners.       Allergies:   No Known Allergies    Review of Symptoms:  A 10 point review of symptoms has been conducted and is negative except for that mentioned in the above HPI.    Physical Exam:    Blood pressure 100/71, pulse 65, temperature 98.2  F (36.8  C), temperature source Oral, resp. rate 18, height  "1.78 m (5' 10.08\"), weight 61 kg (134 lb 8 oz), SpO2 99%.  Gen:    Lying in bed in NAD, A&OX3, comfortable  HEENT: Normocephalic and atraumatic  CV:  Regular rate, well perfused extremities   Pulm:  Non-labored breathing on room air  Abd:  Soft, tender to palpation in RLQ, non-distended, no guarding, not peritonitic, no surgical incisions  Ext:  Warm and well perfused, no obvious deformities    Labs:  CBC RESULTS:   Recent Labs   Lab Test 06/22/25 2055   WBC 13.1*   RBC 5.18   HGB 15.3   HCT 44.4   MCV 86   MCH 29.5   MCHC 34.5   RDW 11.6        BMP RESULTS:   Recent Labs   Lab 06/22/25 2054      POTASSIUM 4.0   CHLORIDE 104   CO2 23   BUN 19.2   CR 0.82   *     LFT RESULTS:   Recent Labs   Lab 06/22/25 2054   AST 30   ALT 23   ALKPHOS 65   BILITOTAL 0.9   ALBUMIN 4.7     Lipase 22  INR 1.18  UA negative    Imaging:    CT Abdomen Pelvis w Contrast  Result Date: 6/22/2025  EXAM: CT ABDOMEN PELVIS W CONTRAST LOCATION: [facility] DATE: 6/22/2025 INDICATION: RLQ pain, concern for appendicitis. COMPARISON: CT abdomen and pelvis without IV contrast 9/29/2023. TECHNIQUE: CT scan of the abdomen and pelvis was performed following injection of IV contrast. Multiplanar reformats were obtained. Dose reduction techniques were used. CONTRAST: 66 mL Isovue 370 IV. FINDINGS: LOWER CHEST: Normal. HEPATOBILIARY: Small amount of focal fat in the usual location in the left hepatic lobe anteroinferiorly. Patent hepatic and portal veins. Mild intrahepatic and extrahepatic biliary ectasia to the papilla of Vater, without obstructive lesion or stone material. Distended gallbladder without calcified stones or adjacent inflammatory changes. Please correlate with liver function tests. Patent hepatic and portal veins. PANCREAS: Normal. SPLEEN: Normal. ADRENAL GLANDS: Normal. KIDNEYS/BLADDER: Resolved left hydronephrosis and hydroureter following passage of distal left " ureteral stone. Tiny calyceal tip stones in the right kidney demonstrated previously are not confidently demonstrated on current study with IV contrast. Both kidneys are well-perfused without hydronephrosis or hydroureter. Normal urinary bladder. BOWEL: Acutely inflamed retrocecal appendix (images 44-56, series 2, images 72-77, series 7, images 43-55, series 3), corresponding to clinical suspicion. No perforation or abscess formation. Recommend surgical consultation. Moderate amount of formed stool material within normal caliber colon. Stomach is filled with residual food material. LYMPH NODES: No suspicious abdominopelvic adenopathy. VASCULATURE: Normal caliber abdominal aorta and the IVC. Mesenteric and bilateral renal vessels are well-perfused. PELVIC ORGANS: Acute appendicitis. Small amount of free fluid in the dependent pelvis. MUSCULOSKELETAL: Mild left convex lumbar curve.     IMPRESSION: 1.  Acutely inflamed retrocecal appendix, corresponding to clinical suspicion. No perforation or abscess formation. Small amount of free fluid in the dependent pelvis. Recommend surgical consultation. 2.  Mild intrahepatic and extrahepatic biliary ectasia to the papilla of Vater, without obstructive lesion or stone material. Please correlate with liver function tests. 3.  Findings discussed with the referring physician, Dr. Zuniga, immediately following the study at 2335 hours. [Critical Result: Acute appendicitis.] Finding was identified on 6/22/2025 11:27 PM CDT. Dr. Zuniga was contacted by me on 6/22/2025 11:37 PM CDT and verbalized understanding of the critical result.

## 2025-06-23 NOTE — PROGRESS NOTES
Surgery Progress Note  06/23/2025       Subjective:  No acute events overnight. States that he is feeling better this morning. Denies nausea/vomiting. Last meal was 10PM last night and has been NPO since.      Objective:  Temp:  [98  F (36.7  C)-98.2  F (36.8  C)] 98.2  F (36.8  C)  Pulse:  [54-65] 56  Resp:  [18] 18  BP: ()/(54-71) 99/56  SpO2:  [98 %-100 %] 99 %    No intake/output data recorded.      Gen: Awake, alert, NAD  Resp: NLB on RA  Abd: soft, nondistended, tender to palpation in the RLQ  Ext: WWP, no edema     Labs:  Recent Labs   Lab 06/22/25 2055   WBC 13.1*   HGB 15.3          Recent Labs   Lab 06/22/25 2054      POTASSIUM 4.0   CHLORIDE 104   CO2 23   BUN 19.2   CR 0.82   *   CHRIST 9.6       Imaging:  No new imaging.     Assessment/Plan:   25 year old otherwise healthy male who presented with RLQ abdominal pain, nausea/vomiting, leukocytosis to 13.1, and abdominal CT consistent with acute appendicitis. Risks/benefits of non-operative and operative management discussed with the patient, who would like to proceed with surgical intervention. He is consented and scheduled for laparoscopic appendectomy later today.     - Diet: NPO for surgery, mIVF  mL/hr  - Pain: Multimodal pain management  - ID: Ceftriaxone and metronidazole  - GI: PRN zofran  - Will likely discharge home after surgery     Seen, examined, and discussed with chief resident, who will discuss with staff.    Antonietta Magdaleno, MS4  Emergency General Surgery Service

## 2025-06-23 NOTE — DISCHARGE SUMMARY
[facility]  Discharge Summary  General Surgery     Ricci Mercedes MRN# [id number]   YOB: 2000 Age: 25 year old     Date of Admission:  6/22/2025  Date of Discharge::  6/23/2025  Admitting Physician:  Brittany Chavez MD  Discharge Physician:  Dr. Phillip Elliott  Primary Care Physician:        No Ref-Primary, Physician          Admission Diagnoses:   Acute appendicitis with localized peritonitis, without perforation, abscess, or gangrene [K35.30]          Discharge Diagnosis:   Same as admission diagnosis.  ***         Procedures:   Procedure(s):  Laparoscopic appendectomy          Consultations:   None          Brief History of Illness:   Ricci Mercedes is a 25 year old male with no PMHx who presented on 6/22/2025 with abdominal pain and nausea/vomiting. He started having umbilical abdominal pain morning of 6/22, which then progressed to his RLQ. He had nausea and 3 episodes of non-bloody, non-bilious vomiting. He had no change in bowel habits and reported a normal BM on 6/22. Denied constipation, diarrhea, fevers, and chills. ROS otherwise negative.           Hospital Course:   The patient was made NPO and started on ceftriaxone/flagyl.     The patient underwent laparoscopic appendectomy on 6/23/2025, which he tolerated well without immediate complications. He was transferred to the PACU for routine postoperative care. The remainder of his postoperative course was unremarkable. Earl was removed on POD#***. He had return of bowel function on POD#*** and his diet was slowly advanced. On the day of discharge, he was tolerating a low residue diet, his pain was well controlled with oral pain medications, he was voiding spontaneously, and ambulating independently. Patient with follow up with *** in *** clinic in *** weeks.           Imaging Studies:     Results for orders placed or performed during the hospital encounter of 06/22/25   CT Abdomen Pelvis w Contrast     Value     Patient Instructions by Harry Swann RMA at 04/03/17 04:57 PM     Author:  Harry Swann RMA Service:  (none) Author Type:  Certified Medical Assistant     Filed:  04/03/17 04:59 PM Encounter Date:  4/3/2017 Status:  Signed     :  Harry Swann RMA (Certified Medical Assistant)            Additional phones numbers: Gastroenterology (GI) at the [facility] or the [facility] - Phone # is [phone]    Family Medicine [facility] Clinic Hours:  Monday - Friday from 8:00 a.m. to 5:00 p.m.  Saturday from 8:00 a.m. to 12:00 p.m.    Additional Educational Resources:  For additional resources regarding your symptoms, diagnosis, or further health information, please visit the Health Resources section on [email or web address] or the Online Health Resources section in [employer].          Revision History        User Key Date/Time User Provider Type Action    > [N/A] 04/03/17 04:59 PM Harry Swann RMA Certified Medical Assistant Sign             Radiologist flags Acute appendicitis. (AA)    Narrative    EXAM: CT ABDOMEN PELVIS W CONTRAST  LOCATION: [facility]  DATE: 6/22/2025    INDICATION: RLQ pain, concern for appendicitis.  COMPARISON: CT abdomen and pelvis without IV contrast 9/29/2023.  TECHNIQUE: CT scan of the abdomen and pelvis was performed following injection of IV contrast. Multiplanar reformats were obtained. Dose reduction techniques were used.  CONTRAST: 66 mL Isovue 370 IV.    FINDINGS:   LOWER CHEST: Normal.    HEPATOBILIARY: Small amount of focal fat in the usual location in the left hepatic lobe anteroinferiorly. Patent hepatic and portal veins. Mild intrahepatic and extrahepatic biliary ectasia to the papilla of Vater, without obstructive lesion or stone   material. Distended gallbladder without calcified stones or adjacent inflammatory changes. Please correlate with liver function tests. Patent hepatic and portal veins.    PANCREAS: Normal.    SPLEEN: Normal.    ADRENAL GLANDS: Normal.    KIDNEYS/BLADDER: Resolved left hydronephrosis and hydroureter following passage of distal left ureteral stone. Tiny calyceal tip stones in the right kidney demonstrated previously are not confidently demonstrated on current study with IV contrast. Both   kidneys are well-perfused without hydronephrosis or hydroureter. Normal urinary bladder.    BOWEL: Acutely inflamed retrocecal appendix (images 44-56, series 2, images 72-77, series 7, images 43-55, series 3), corresponding to clinical suspicion. No perforation or abscess formation. Recommend surgical consultation. Moderate amount of formed   stool material within normal caliber colon. Stomach is filled with residual food material.    LYMPH NODES: No suspicious abdominopelvic adenopathy.    VASCULATURE: Normal caliber abdominal aorta and the IVC. Mesenteric and bilateral renal vessels are well-perfused.    PELVIC ORGANS: Acute appendicitis. Small amount of  free fluid in the dependent pelvis.    MUSCULOSKELETAL: Mild left convex lumbar curve.      Impression    IMPRESSION:   1.  Acutely inflamed retrocecal appendix, corresponding to clinical suspicion. No perforation or abscess formation. Small amount of free fluid in the dependent pelvis. Recommend surgical consultation.    2.  Mild intrahepatic and extrahepatic biliary ectasia to the papilla of Vater, without obstructive lesion or stone material. Please correlate with liver function tests.    3.  Findings discussed with the referring physician, Dr. Zuniga, immediately following the study at 2335 hours.      [Critical Result: Acute appendicitis.]    Finding was identified on 6/22/2025 11:27 PM CDT.     Dr. Zuniga was contacted by me on 6/22/2025 11:37 PM CDT and verbalized understanding of the critical result.               Medications Prior to Admission:   (Not in a hospital admission)             Discharge Medications:     Current Discharge Medication List        START taking these medications    Details   acetaminophen (TYLENOL) 325 MG tablet Take 2 tablets (650 mg) by mouth every 4 hours as needed for mild pain.  Qty: 50 tablet, Refills: 0    Associated Diagnoses: Acute appendicitis with localized peritonitis, without perforation, abscess, or gangrene      ibuprofen (ADVIL/MOTRIN) 600 MG tablet Take 1 tablet (600 mg) by mouth every 6 hours as needed for other (mild and/or inflammatory pain).  Qty: 30 tablet, Refills: 0    Associated Diagnoses: Acute appendicitis with localized peritonitis, without perforation, abscess, or gangrene      senna-docusate (SENOKOT-S/PERICOLACE) 8.6-50 MG tablet Take 1-2 tablets by mouth 2 times daily.  Qty: 30 tablet, Refills: 0    Associated Diagnoses: Acute appendicitis with localized peritonitis, without perforation, abscess, or gangrene                     Medications Discontinued or Adjusted During This Hospitalization:   {:[id number]}           Antibiotics Prescribed at Discharge:    {:396339}           Day of Discharge Physical Exam:   Temp:  [98  F (36.7  C)-98.2  F (36.8  C)] 98.2  F (36.8  C)  Pulse:  [48-65] 48  Resp:  [18] 18  BP: ()/(54-71) 101/65  SpO2:  [98 %-100 %] 99 %    General: awake, alert, no acute distress, laying comfortably in bed   CV: warm, well perfused   Pulm: breathing comfortably on room air   Abdomen: soft, non-distended, appropriately tender, no rebound or guarding;  Incision***   Extremities: no edema, moving all extremities spontaneously and without apparent deficit            Final Pathology Result:   ***           Discharge Instructions and Follow-Up:     Discharge Procedure Orders   When to call - Contact Surgeon Team   Order Comments: You may experience symptoms that require follow-up before your scheduled appointment. Contact your Surgeon Team if you are concerned about pain control, large amount of bleeding, blood clots, constipation, or if you experience signs of infection (fever, growing tenderness at the surgery site, a large amount of drainage, severe pain, foul-smelling drainage, redness or swelling.     When to call - Reach out to Urgent Care   Order Comments: If you are experiencing uncontrolled Nausea and Vomiting, uncontrolled pain, inability to urinate and uncomfortable, and in need of immediate care, and you are NOT able to reach your Surgeon Team, go to an Urgent Care clinic. Do NOT go to the Emergency Room unless you have shortness of breath, chest pain, or other signs of a medical emergency.     When to call - Reasons to Call 911   Order Comments: Call 911 immediately if you experience sudden-onset chest pain, arm weakness/numbness, slurred speech, or shortness of breath     Symptoms - Fever Management   Order Comments: A low grade fever can be expected after surgery. Your Provider many have prescribed an Opioid pain medication that also contains acetaminophen (TYLENOL) that may help with Fever management.  Do NOT take additional  acetaminophen (TYLENOL) in combination with an Opioid/acetaminophen (TYLENOL) product. Read the labels on your Over The Counter (OTC) medications with care.     Symptoms - Reduced Urine Output   Order Comments: If it has been greater than 8 hours since you have urinated despite drinking plenty of water, call your Surgeon Team.     No driving or operating machinery   Order Comments: Do NOT drive any vehicle or operate mechanical equipment for 24 hours following the end of your surgery.  Even though you may feel normal, your reactions may be affected by Anesthesia medication you received.     No Alcohol   Order Comments: Do NOT drink alcoholic beverages for 24 hours following your surgery and while taking pain medications.     Diet Instructions   Order Comments: Follow your surgeon's orders for any diet restrictions.  If you did not receive any diet restrictions, you may drink clear liquids (apple juice, ginger ale, 7-up, broth, etc.), and progress to your regular diet as you feel able. It is important to stay well-hydrated after surgery and drink plenty of water.     Dressing / Wound Care - Wound   Order Comments: Incisions  - The type of wound closure used for your incision:  Dermabond  - Do not scrub incisions or submerge wounds (e.g. bath, pool, hot tub, etc.) for 2 weeks or until the glue or steri-strips have come off  - Avoid applying any lotions or ointments near the areas where the Dermabond glue was used  - Leave incision open to air.  Cover with gauze only if needed for comfort or to protect clothing from drainage     Discharge Instructions - Comfort and Pain Management   Order Comments: Pain after surgery is normal and expected. You will have some amount of pain after surgery. Your pain will improve with time. There are several things you can do to help reduce your pain including: rest, ice, and using pain medications as needed. Use pain interventions and don't wait until pain level is out of control.  "Contact your Surgeon Team if you have pain that persists or worsens after surgery despite rest, ice, and taking your medication(s) as prescribed. You may have a dry mouth, a sore throat, muscles aches or trouble sleeping, and these symptoms should go away after 24 hours.     Discharge Instructions - Rest   Order Comments: Rest and relax for the next 24 hours. Make arrangements to have someone stay with you overnight, and avoid hazardous and strenuous activities.  Do NOT make any important decisions for the next 24 hours.     Shower/Bathing - Restrictions: Let water run over incisions and pat dry. No tub baths until bleeding stops.   Order Comments: Shower/Bathing - Restrictions: Let water run over incisions and pat dry. No tub baths until bleeding stops. Do NOT soak in any body of water (lake, pool, bath, etc.) for 7-10 days postoperatively.     Order Specific Question Answer Comments   Is discharge order? Yes      May return to work (WITH restrictions)   Order Comments: May return to work as tolerated with the following restrictions:   Activity  - No strenuous exercise for 3-4 weeks  - No lifting, pushing, pulling more than 15-20 pounds for 3-4 weeks  - Do not strain with bowel movements  - Do not operate a motor vehicle while taking narcotic pain medications     Order Specific Question Answer Comments   Is discharge order? Yes      Return to normal activity as tolerated   Order Comments: Return to normal activity as tolerated     Order Specific Question Answer Comments   Is discharge order? Yes               Home Health Care:     {:374163}           Discharge Disposition:     { :[id number]::\"Discharged to home\"}      Condition at discharge: {:819283}    Patient was seen, examined, and discussed on day of discharge with chief resident***, who discussed with staff surgeon***.    Antonietta Magdaleno, MS4  Emergency General Surgery Service      "

## 2025-06-23 NOTE — ED TRIAGE NOTES
Pt presents to the ED with c/po of abdominal pain that radiates to the R-side. Pain is described as constant and excruciating. Pain is exacerbated when pt is upright and relieved when pt is laying flat. Pain is rated 10/10. Pt reports symptom onset was sudden and started this morning. Pt denies any recent abdominal injury or trauma. Pt endorses nausea and vomiting, last episode of vomiting was 40 minutes before presenting to the ED.        Triage Assessment (Adult)       Row Name 06/22/25 2027          Triage Assessment    Airway WDL WDL        Respiratory WDL    Respiratory WDL WDL        Skin Circulation/Temperature WDL    Skin Circulation/Temperature WDL WDL        Cardiac WDL    Cardiac WDL WDL        Peripheral/Neurovascular WDL    Peripheral Neurovascular WDL WDL        Cognitive/Neuro/Behavioral WDL    Cognitive/Neuro/Behavioral WDL WDL

## 2025-06-23 NOTE — ED PROVIDER NOTES
"ED Provider Note  [facility]      History     Chief Complaint   Patient presents with    Abdominal Pain     HPI  Ricci Mercedes is a 25 year old male who presented with abdominal pain and vomiting present to the right lower quadrant.  [place] [facility] [place] demonstrated acute [place]icitis.  He arrives afebrile and continues to have pain in the right lower quadrant.    He is not anticoagulated.  Auramine suppressed.    Past Medical History  No past medical history on file.  No past surgical history on file.  No current outpatient medications on file.    No Known Allergies  Family History  No family history on file.  Social History   Social History     Tobacco Use    Smoking status: Never    Smokeless tobacco: Never   Substance Use Topics    Alcohol use: Never    Drug use: Never      A medically appropriate review of systems was performed with pertinent positives and negatives noted in the HPI, and all other systems negative.    Physical Exam   BP: 121/71  Pulse: 56  Temp: 98  F (36.7  C)  Resp: 18  Height: 170.2 cm (5' 7\")  Weight: 61 kg (134 lb 8 oz)  SpO2: 100 %  Physical Exam  Gen:A&Ox3, no acute distress  HEENT:PERRL, no facial tenderness or wounds, head atraumatic, oropharynx clear, mucous membranes moist, TMs clear bilaterally  Neck:no bony tenderness or step offs, no JVD, trachea midline  Back: no CVA tenderness, no midline bony tenderness  CV:RRR without murmurs  PULM:Clear to auscultation bilaterally  Abd: soft, tender in RLQ without rebound tenderness  UE:No traumatic injuries, skin normal  LE:no traumatic injuries, skin normal  Neuro:CN II-XII intact, strength 5/5 throughout  Skin: no rashes or ecchymoses    ED Course, Procedures, & Data      Procedures           IV Antibiotics given and/or elevated Lactate of 0 and no sepsis note found - Delete this reminder and enter the sepsis note or '.edcms' before signing chart.>>>     Results for orders placed " or performed during the hospital encounter of 06/22/25   CT Abdomen Pelvis w Contrast   Result Value Ref Range    Radiologist flags Acute appendicitis. (AA)     Impression    IMPRESSION:   1.  Acutely inflamed retrocecal appendix, corresponding to clinical suspicion. No perforation or abscess formation. Small amount of free fluid in the dependent pelvis. Recommend surgical consultation.    2.  Mild intrahepatic and extrahepatic biliary ectasia to the papilla of Vater, without obstructive lesion or stone material. Please correlate with liver function tests.    3.  Findings discussed with the referring physician, Dr. Zuniga, immediately following the study at 2335 hours.      [Critical Result: Acute appendicitis.]    Finding was identified on 6/22/2025 11:27 PM CDT.     Dr. Zuniga was contacted by me on 6/22/2025 11:37 PM CDT and verbalized understanding of the critical result.    Extra Red Top Tube   Result Value Ref Range    Hold Specimen JIC    Extra Green Top (Lithium Heparin) Tube   Result Value Ref Range    Hold Specimen JIC    Extra Purple Top Tube   Result Value Ref Range    Hold Specimen JIC    Comprehensive metabolic panel   Result Value Ref Range    Sodium 141 135 - 145 mmol/L    Potassium 4.0 3.4 - 5.3 mmol/L    Carbon Dioxide (CO2) 23 22 - 29 mmol/L    Anion Gap 14 7 - 15 mmol/L    Urea Nitrogen 19.2 6.0 - 20.0 mg/dL    Creatinine 0.82 0.67 - 1.17 mg/dL    GFR Estimate >90 >60 mL/min/1.73m2    Calcium 9.6 8.8 - 10.4 mg/dL    Chloride 104 98 - 107 mmol/L    Glucose 103 (H) 70 - 99 mg/dL    Alkaline Phosphatase 65 40 - 150 U/L    AST 30 0 - 45 U/L    ALT 23 0 - 70 U/L    Protein Total 7.5 6.4 - 8.3 g/dL    Albumin 4.7 3.5 - 5.2 g/dL    Bilirubin Total 0.9 <=1.2 mg/dL   Result Value Ref Range    Lipase 22 13 - 60 U/L   UA with Microscopic reflex to Culture    Specimen: Urine, Midstream   Result Value Ref Range    Color Urine Yellow Colorless, Straw, Light Yellow, Yellow    Appearance Urine Clear Clear     Glucose Urine Negative Negative mg/dL    Bilirubin Urine Negative Negative    Ketones Urine 40 (A) Negative mg/dL    Specific Gravity Urine 1.031 1.003 - 1.035    Blood Urine Negative Negative    pH Urine 6.5 5.0 - 7.0    Protein Albumin Urine Negative Negative mg/dL    Urobilinogen Urine Normal Normal mg/dL    Nitrite Urine Negative Negative    Leukocyte Esterase Urine Negative Negative    Mucus Urine Present (A) None Seen /LPF    RBC Urine <1 <=2 /HPF    WBC Urine 1 <=5 /HPF    Squamous Epithelials Urine <1 <=1 /HPF   CBC with platelets and differential   Result Value Ref Range    WBC Count 13.1 (H) 4.0 - 11.0 10e3/uL    RBC Count 5.18 4.40 - 5.90 10e6/uL    Hemoglobin 15.3 13.3 - 17.7 g/dL    Hematocrit 44.4 40.0 - 53.0 %    MCV 86 78 - 100 fL    MCH 29.5 26.5 - 33.0 pg    MCHC 34.5 31.5 - 36.5 g/dL    RDW 11.6 10.0 - 15.0 %    Platelet Count 155 150 - 450 10e3/uL    % Neutrophils 81 %    % Lymphocytes 11 %    % Monocytes 7 %    % Eosinophils 1 %    % Basophils 0 %    % Immature Granulocytes 0 %    NRBCs per 100 WBC 0 <1 /100    Absolute Neutrophils 10.6 (H) 1.6 - 8.3 10e3/uL    Absolute Lymphocytes 1.4 0.8 - 5.3 10e3/uL    Absolute Monocytes 0.9 0.0 - 1.3 10e3/uL    Absolute Eosinophils 0.2 0.0 - 0.7 10e3/uL    Absolute Basophils 0.0 0.0 - 0.2 10e3/uL    Absolute Immature Granulocytes 0.0 <=0.4 10e3/uL    Absolute NRBCs 0.0 10e3/uL   INR   Result Value Ref Range    INR 1.18 (H) 0.85 - 1.15    PT 14.9 (H) 11.8 - 14.8 Seconds   Adult Type and Screen   Result Value Ref Range    SPECIMEN EXPIRATION DATE 6/26/2025 11:59:00 PM CDT      Medications   ondansetron (ZOFRAN) injection 4 mg (has no administration in time range)   metroNIDAZOLE (FLAGYL) infusion 500 mg (has no administration in time range)   lactated ringers infusion 1,000 mL (1,000 mLs Intravenous $New Bag 6/23/25 0145)   morphine (PF) injection 4 mg (4 mg Intravenous $Given 6/22/25 0710)   sodium chloride 0.9 % bag for CT scan flush (56 mLs As instructed  $Given 6/22/25 2310)   iopamidol (ISOVUE-370) solution 100 mL (66 mLs Intravenous $Given 6/22/25 2309)   cefTRIAXone (ROCEPHIN) 1 g vial to attach to  mL bag for ADULTS or NS 50 mL bag for PEDS (0 g Intravenous Stopped 6/23/25 0058)   metroNIDAZOLE (FLAGYL) infusion 500 mg (500 mg Intravenous $New Bag 6/23/25 0053)          Critical care was not performed.     Medical Decision Making  The patient's presentation was of high complexity (an acute health issue posing potential threat to life or bodily function).    The patient's evaluation involved:  review of external note(s) from 1 sources ([facility] [place] prior to transfer)  Reviewed labs and imaging from prior to transfer.   Ordered 2 labs  Discussed with surgical service.     The patient's management necessitated high risk (a decision regarding emergency major procedure (went to the OR with operative service)).    Assessment & Plan    25-year-old male presenting in transfer from [facility] for appendicitis.  Vital stable.  IV in place.  Labs and CT done prior to transfer reviewed.  Patient to continue be NPO.  IV fluids and pain medicine as needed.  Adding on INR and type & screen    Surgical consult placed.    2:29 AM  Discussed with surgery. Continue NPO with plan for OR in the AM.       I have reviewed the nursing notes. I have reviewed the findings, diagnosis, plan and need for follow up with the patient.    New Prescriptions    No medications on file       Final diagnoses:   Acute appendicitis with localized peritonitis, without perforation, abscess, or gangrene       Brittany Chavez MD   [facility] EMERGENCY DEPARTMENT  6/22/2025     Brittany Chavez MD  06/23/25 6503

## 2025-06-23 NOTE — ANESTHESIA CARE TRANSFER NOTE
Patient: Ricci Mercedes    Procedure: Procedure(s):  Laparoscopic appendectomy       Diagnosis: Acute appendicitis [K35.80]  Diagnosis Additional Information: No value filed.    Anesthesia Type:   General     Note:    Oropharynx: oropharynx clear of all foreign objects and spontaneously breathing  Level of Consciousness: drowsy  Oxygen Supplementation: nasal cannula  Level of Supplemental Oxygen (L/min / FiO2): 2  Independent Airway: airway patency satisfactory and stable  Dentition: dentition unchanged  Vital Signs Stable: post-procedure vital signs reviewed and stable  Report to RN Given: handoff report given  Patient transferred to: PACU    Handoff Report: Identifed the Patient, Identified the Reponsible Provider, Reviewed the pertinent medical history, Discussed the surgical course, Reviewed Intra-OP anesthesia mangement and issues during anesthesia, Set expectations for post-procedure period and Allowed opportunity for questions and acknowledgement of understanding  Vitals:  Vitals Value Taken Time   BP     Temp     Pulse 67 06/23/25 11:21   Resp 12 06/23/25 11:21   SpO2 100 % 06/23/25 11:21   Vitals shown include unfiled device data.    Electronically Signed By: MAYELIN Pradhan CRNA  June 23, 2025  11:21 AM

## 2025-06-24 ENCOUNTER — TELEPHONE (OUTPATIENT)
Dept: SURGERY | Facility: CLINIC | Age: 25
End: 2025-06-24
Payer: COMMERCIAL

## 2025-06-24 LAB
C AG RBC QL: NEGATIVE
E AG RBC QL: NEGATIVE
K AG RBC QL: NEGATIVE
LITTLE C AG RBC QL: POSITIVE
LITTLE E AG RBC QL: POSITIVE
SPECIMEN EXP DATE BLD: NORMAL

## 2025-06-24 NOTE — TELEPHONE ENCOUNTER
[facility] Call Center    Phone Message    May a detailed message be left on voicemail: yes     Reason for Call: Other: Ricci is calling in asking for a call back to request a letter from Dr. Elliott for his school following his recent procedure in the hospital. Please call back to discuss.     Action Taken: Message routed to:  [facility] Gen Surg    Travel Screening: Not Applicable     Date of Service:

## 2025-06-25 ENCOUNTER — PATIENT OUTREACH (OUTPATIENT)
Dept: SURGERY | Facility: CLINIC | Age: 25
End: 2025-06-25
Payer: COMMERCIAL

## 2025-06-25 NOTE — LETTER
June 25, 2025      TO: Ricci Mercedes  [address] [facility] [address]       To Whom It May Concern:    Ricci Mercedes is under our professional care for an acute medical condition.  Mr. Mercedes underwent a surgical procedure and will require a period of recovery.  He is unable to return to school until 7/21/25. Upon his return to school he will have no restrictions on his activity.    Please contact our office with questions or concerns.    Sincerely,         Ronit Farias RN on behalf of Dr. Phillip Elliott

## 2025-06-25 NOTE — PROGRESS NOTES
RN Post-Op/Post-Discharge Care Coordination Note    Mr. Ricci Mercedes is a 25 year old male who underwent laparoscopic appendectomy on 6/23 with Dr. Phillip Elliott.  Spoke with Patient.    Support  Patient able to care for self independently.     Health Status  Nausea/Vomiting: Patient denies nausea/vomiting.  Eating/drinking: Patient is able to eat and drink without any complaints.  Diet:  Regular  Bowel habits: Patient reports no bowel movement since surgery. Will start using Senna as prescribed today.  Fevers/chills: Patient denies any fever or chills.  Incisions: Patient denies any signs and symptoms of infection. Wound closure: Skin Sealant  Pain: tolerable with the use of ibuprofen and Tylenol as prescribed. Discussed only using oxycodone for severe pain.   New Medications: Ibuprofen, Oxycodone, Senna, and Tylenol    Activity/Restrictions  Discussed importance of early mobility after surgery. Suggested getting up and walking and at a minimum walking room to room every hour.    Adherence of the following restrictions:   No lifting in excess of 15-20 pounds for 3-4 weeks    Equipment  None    Pathology reviewed with patient:  No, not yet available    Forms/Letters  Yes. RTS 7/21/25. Letter created and emailed per patient request.    All of his questions were answered including reviewing restrictions and wound care.  He will call this office if he has any further questions and/or concerns.      In lieu of a post-op clinic patient that patient would like to be contacted in approximately 7-10 days via telephone.    A copy of this note was routed to the primary surgeon.    Whom and When to Call  Patient acknowledges understanding of how to manage any medication changes and when to seek medical care.     Patient advised that if after hour medical concerns arise to please call [phone] and choose option 4 to speak to the physician on call.

## 2025-06-26 LAB
PATH REPORT.COMMENTS IMP SPEC: NORMAL
PATH REPORT.COMMENTS IMP SPEC: NORMAL
PATH REPORT.FINAL DX SPEC: NORMAL
PATH REPORT.GROSS SPEC: NORMAL
PATH REPORT.MICROSCOPIC SPEC OTHER STN: NORMAL
PATH REPORT.RELEVANT HX SPEC: NORMAL
PHOTO IMAGE: NORMAL

## 2025-07-02 ENCOUNTER — PATIENT OUTREACH (OUTPATIENT)
Dept: SURGERY | Facility: CLINIC | Age: 25
End: 2025-07-02
Payer: COMMERCIAL

## 2025-07-02 NOTE — PROGRESS NOTES
Ricci Mercedes was contacted several days post procedure via telephone for a status update and elected to have a telephone follow-up call approximately 7-10 days after original contact in lieu of a clinic visit with Dr. Phillip Elliott.  He continues to do well and the skin glue  has not peeled off.  The patients wounds are healed and the area is C/D/I.  He is afebrile, pain free, and keagan po; the patient is slowly resuming his normal activity. Discussed restrictions including no lifting in excess of 15 pounds for 2 more weeks.    Pathology was reviewed with the patient: Yes  Final Diagnosis   A. APPENDIX, LAPAROSCOPIC APPENDECTOMY:  - Acute appendicitis and periappendicitis.      Patient would like to have a PO appointment with a surgeon. Scheduled on 7/8 with Dr. Mcclellan.

## 2025-07-07 ENCOUNTER — PRE VISIT (OUTPATIENT)
Dept: SURGERY | Facility: CLINIC | Age: 25
End: 2025-07-07
Payer: COMMERCIAL

## 2025-07-08 ENCOUNTER — OFFICE VISIT (OUTPATIENT)
Dept: SURGERY | Facility: CLINIC | Age: 25
End: 2025-07-08
Payer: COMMERCIAL

## 2025-07-08 VITALS
TEMPERATURE: 98.3 F | BODY MASS INDEX: 19.7 KG/M2 | SYSTOLIC BLOOD PRESSURE: 107 MMHG | DIASTOLIC BLOOD PRESSURE: 74 MMHG | RESPIRATION RATE: 16 BRPM | HEART RATE: 61 BPM | OXYGEN SATURATION: 98 % | HEIGHT: 70 IN | WEIGHT: 137.6 LBS

## 2025-07-08 DIAGNOSIS — Z98.890 POSTOPERATIVE STATE: Primary | ICD-10-CM

## 2025-07-08 ASSESSMENT — PAIN SCALES - GENERAL: PAINLEVEL_OUTOF10: NO PAIN (0)

## 2025-07-08 NOTE — PROGRESS NOTES
Ricci Mercedes is status post:  6/23/2025  Laparoscopic appendectomy (N/A) Procedure: Laparoscopic appendectomy;  Surgeon: Phillip Elliott MD;  Location: [facility] OR    Surgery without complications.  Post-op course without complications.  Incisions examined, no signs of infection.  All of the patients questions were answered.  Standard post-op instructions and restrictions given.  Follow-up as needed

## 2025-07-08 NOTE — NURSING NOTE
"Chief Complaint   Patient presents with    Surgical Followup     Post-op, DOS 06/23/2025.       Vitals:    07/08/25 0954   BP: 107/74   BP Location: Left arm   Patient Position: Sitting   Cuff Size: Adult Regular   Pulse: 61   Resp: 16   Temp: 98.3  F (36.8  C)   TempSrc: Oral   SpO2: 98%   Weight: 62.4 kg (137 lb 9.6 oz)   Height: 1.778 m (5' 10\")       Body mass index is 19.74 kg/m .    Nayeli Chaudhari, EMT    "

## 2025-07-08 NOTE — LETTER
7/8/2025       RE: Ricci Mercedes  [address]  [facility] [address]     Dear Colleague,    Thank you for referring your patient, Ricci Mercedes, to the [facility] [place] at [facility]. Please see a copy of my visit note below.    Ricci Mercedes is status post:  6/23/2025  Laparoscopic appendectomy (N/A) Procedure: Laparoscopic appendectomy;  Surgeon: Phillip Elliott MD;  Location: [facility] OR    Surgery without complications.  Post-op course without complications.  Incisions examined, no signs of infection.  All of the patients questions were answered.  Standard post-op instructions and restrictions given.  Follow-up as needed     Again, thank you for allowing me to participate in the care of your patient.      Sincerely,    Nash Mcclellan MD

## 2025-07-08 NOTE — PATIENT INSTRUCTIONS
You met with Dr. Nash Mcclellan.      Today's visit instructions:    Return to the Surgery Clinic on an as needed basis.        If you have questions please contact [place] RN or [place] RN during regular clinic hours, Monday through Friday 7:30 AM - 4:00 PM, or you can contact us via [employer] at anytime.       If you have urgent needs after-hours, weekends, or holidays please call the hospital at [phone] and ask to speak with our on-call General Surgery Team.    Appointment [phone]  Nurse Advice ([place] or [place]): [phone]   Surgery Scheduler ([place]): [phone]  Billing Customer Service:  [phone]  Fax: [phone]

## (undated) DEVICE — LINEN TOWEL PACK X6 WHITE 5487

## (undated) DEVICE — PREP CHLORAPREP 26ML TINTED HI-LITE ORANGE 930815

## (undated) DEVICE — GLOVE PROTEXIS BLUE W/NEU-THERA 8.0  2D73EB80

## (undated) DEVICE — ESU GROUND PAD ADULT W/CORD E7507

## (undated) DEVICE — ENDO TROCAR FIRST ENTRY KII FIOS Z-THRD 05X100MM CTF03

## (undated) DEVICE — SU MONOCRYL 4-0 PS-2 27" UND Y426H

## (undated) DEVICE — SYR 30ML LL W/O NDL 302832

## (undated) DEVICE — SU DERMABOND ADVANCED .7ML DNX12

## (undated) DEVICE — SUCTION MANIFOLD NEPTUNE 2 SYS 4 PORT 0702-020-000

## (undated) DEVICE — LINEN TOWEL PACK X5 5464

## (undated) DEVICE — PACK GOWN 3/PK DISP XL SBA32GPFCB

## (undated) DEVICE — DRAPE SHEET REV FOLD 3/4 9349

## (undated) DEVICE — ANTIFOG SOLUTION W/FOAM PAD 31142527

## (undated) DEVICE — SU PDS II 0 ENDOLOOP EZ10G

## (undated) DEVICE — ENDO TROCAR SLEEVE KII Z-THREADED 05X100MM CTS02

## (undated) DEVICE — TUBING SMOKE EVAC PNEUMOCLEAR HIGH FLOW 0620050250

## (undated) DEVICE — ESU LIGASURE LAPAROSCOPIC BLUNT TIP SEALER 5MMX37CM LF1837

## (undated) DEVICE — ENDO POUCH UNIVERSAL RETRIEVAL SYSTEM INZII 5MM CD003

## (undated) DEVICE — SOL WATER IRRIG 1000ML BOTTLE 2F7114

## (undated) DEVICE — Device

## (undated) DEVICE — NDL INSUFFLATION 13GA 120MM C2201

## (undated) RX ORDER — HYDROMORPHONE HYDROCHLORIDE 1 MG/ML
INJECTION, SOLUTION INTRAMUSCULAR; INTRAVENOUS; SUBCUTANEOUS
Status: DISPENSED
Start: 2025-06-23

## (undated) RX ORDER — BUPIVACAINE HYDROCHLORIDE AND EPINEPHRINE 2.5; 5 MG/ML; UG/ML
INJECTION, SOLUTION EPIDURAL; INFILTRATION; INTRACAUDAL; PERINEURAL
Status: DISPENSED
Start: 2025-06-23

## (undated) RX ORDER — ONDANSETRON 2 MG/ML
INJECTION INTRAMUSCULAR; INTRAVENOUS
Status: DISPENSED
Start: 2025-06-23

## (undated) RX ORDER — ENOXAPARIN SODIUM 100 MG/ML
INJECTION SUBCUTANEOUS
Status: DISPENSED
Start: 2025-06-23

## (undated) RX ORDER — DEXAMETHASONE SODIUM PHOSPHATE 4 MG/ML
INJECTION, SOLUTION INTRA-ARTICULAR; INTRALESIONAL; INTRAMUSCULAR; INTRAVENOUS; SOFT TISSUE
Status: DISPENSED
Start: 2025-06-23

## (undated) RX ORDER — PROPOFOL 10 MG/ML
INJECTION, EMULSION INTRAVENOUS
Status: DISPENSED
Start: 2025-06-23

## (undated) RX ORDER — ACETAMINOPHEN 325 MG/1
TABLET ORAL
Status: DISPENSED
Start: 2025-06-23

## (undated) RX ORDER — FENTANYL CITRATE 50 UG/ML
INJECTION, SOLUTION INTRAMUSCULAR; INTRAVENOUS
Status: DISPENSED
Start: 2025-06-23

## (undated) RX ORDER — FENTANYL CITRATE-0.9 % NACL/PF 10 MCG/ML
PLASTIC BAG, INJECTION (ML) INTRAVENOUS
Status: DISPENSED
Start: 2025-06-23

## (undated) RX ORDER — CEFAZOLIN SODIUM/WATER 2 G/20 ML
SYRINGE (ML) INTRAVENOUS
Status: DISPENSED
Start: 2025-06-23